# Patient Record
Sex: MALE | Race: BLACK OR AFRICAN AMERICAN | Employment: OTHER | URBAN - METROPOLITAN AREA
[De-identification: names, ages, dates, MRNs, and addresses within clinical notes are randomized per-mention and may not be internally consistent; named-entity substitution may affect disease eponyms.]

---

## 2020-02-25 ENCOUNTER — OFFICE VISIT (OUTPATIENT)
Dept: INTERNAL MEDICINE CLINIC | Age: 65
End: 2020-02-25
Payer: MEDICAID

## 2020-02-25 VITALS
WEIGHT: 154 LBS | BODY MASS INDEX: 20.86 KG/M2 | SYSTOLIC BLOOD PRESSURE: 142 MMHG | HEIGHT: 72 IN | DIASTOLIC BLOOD PRESSURE: 80 MMHG

## 2020-02-25 PROBLEM — G47.33 OSA (OBSTRUCTIVE SLEEP APNEA): Status: ACTIVE | Noted: 2020-02-25

## 2020-02-25 PROCEDURE — G8484 FLU IMMUNIZE NO ADMIN: HCPCS | Performed by: INTERNAL MEDICINE

## 2020-02-25 PROCEDURE — 3017F COLORECTAL CA SCREEN DOC REV: CPT | Performed by: INTERNAL MEDICINE

## 2020-02-25 PROCEDURE — 4004F PT TOBACCO SCREEN RCVD TLK: CPT | Performed by: INTERNAL MEDICINE

## 2020-02-25 PROCEDURE — G8420 CALC BMI NORM PARAMETERS: HCPCS | Performed by: INTERNAL MEDICINE

## 2020-02-25 PROCEDURE — G8427 DOCREV CUR MEDS BY ELIG CLIN: HCPCS | Performed by: INTERNAL MEDICINE

## 2020-02-25 PROCEDURE — 99203 OFFICE O/P NEW LOW 30 MIN: CPT | Performed by: INTERNAL MEDICINE

## 2020-02-25 RX ORDER — GABAPENTIN 800 MG/1
800 TABLET ORAL 2 TIMES DAILY
COMMUNITY
End: 2020-12-07 | Stop reason: SDUPTHER

## 2020-02-25 RX ORDER — CYCLOBENZAPRINE HCL 10 MG
10 TABLET ORAL 2 TIMES DAILY PRN
Status: ON HOLD | COMMUNITY
End: 2020-07-09 | Stop reason: ALTCHOICE

## 2020-02-25 RX ORDER — MELOXICAM 15 MG/1
15 TABLET ORAL DAILY
Status: ON HOLD | COMMUNITY
End: 2020-07-10 | Stop reason: HOSPADM

## 2020-02-25 RX ORDER — LIDOCAINE 50 MG/G
1 PATCH TOPICAL DAILY
COMMUNITY

## 2020-02-25 SDOH — ECONOMIC STABILITY: TRANSPORTATION INSECURITY
IN THE PAST 12 MONTHS, HAS LACK OF TRANSPORTATION KEPT YOU FROM MEETINGS, WORK, OR FROM GETTING THINGS NEEDED FOR DAILY LIVING?: NO

## 2020-02-25 SDOH — ECONOMIC STABILITY: FOOD INSECURITY: WITHIN THE PAST 12 MONTHS, THE FOOD YOU BOUGHT JUST DIDN'T LAST AND YOU DIDN'T HAVE MONEY TO GET MORE.: NEVER TRUE

## 2020-02-25 SDOH — ECONOMIC STABILITY: INCOME INSECURITY: HOW HARD IS IT FOR YOU TO PAY FOR THE VERY BASICS LIKE FOOD, HOUSING, MEDICAL CARE, AND HEATING?: NOT HARD AT ALL

## 2020-02-25 SDOH — ECONOMIC STABILITY: FOOD INSECURITY: WITHIN THE PAST 12 MONTHS, YOU WORRIED THAT YOUR FOOD WOULD RUN OUT BEFORE YOU GOT MONEY TO BUY MORE.: NEVER TRUE

## 2020-02-25 SDOH — ECONOMIC STABILITY: TRANSPORTATION INSECURITY
IN THE PAST 12 MONTHS, HAS THE LACK OF TRANSPORTATION KEPT YOU FROM MEDICAL APPOINTMENTS OR FROM GETTING MEDICATIONS?: NO

## 2020-02-25 SDOH — HEALTH STABILITY: MENTAL HEALTH: HOW OFTEN DO YOU HAVE A DRINK CONTAINING ALCOHOL?: 2-4 TIMES A MONTH

## 2020-02-25 ASSESSMENT — PATIENT HEALTH QUESTIONNAIRE - PHQ9
SUM OF ALL RESPONSES TO PHQ9 QUESTIONS 1 & 2: 0
SUM OF ALL RESPONSES TO PHQ QUESTIONS 1-9: 0
SUM OF ALL RESPONSES TO PHQ QUESTIONS 1-9: 0
2. FEELING DOWN, DEPRESSED OR HOPELESS: 0
1. LITTLE INTEREST OR PLEASURE IN DOING THINGS: 0

## 2020-02-25 NOTE — PROGRESS NOTES
2020     Tammy Gilliland (:  1955) is a 60 y.o. male, here for evaluation of the following medical concerns:    Chief Complaint   Patient presents with    New Patient     pt c/o arthritis and headaches        HPI    History of chronic back pain - has used steroid shot injections but doesn't really like them. Back pain has been ongoing since . Last MRI was 1-3 years ago. Had recommended a fusion but left [place]. Last MRI was in [facility], through the  [facility] [address]. Takes cymbalta for the pain. Has done PT. Has some weakness in both legs, intermittently, will be one side or the other. Feels pain in the anterior thighs when walking. Feet have been cramping up recently. Gabapentin is for nerve damage in the left leg from the back pain. Had one EMG/nerve conduction test in [facility], one here, a year ago. Numbness is anterior thigh, to the knee. Does have some neck pain. Intermittently has weakness in both arms, can't raise above the shoulders. Notes weakness in the hands, does have tendonitis in the left hand. Not sure if he has had an MRI of the cervical spine. Review of Systems   Musculoskeletal: Positive for back pain and neck pain. Neurological: Positive for numbness. Prior to Visit Medications    Medication Sig Taking? Authorizing Provider   cyclobenzaprine (FLEXERIL) 10 MG tablet Take 10 mg by mouth 2 times daily as needed for Muscle spasms Yes Historical Provider, MD   gabapentin (NEURONTIN) 800 MG tablet Take 800 mg by mouth 2 times daily. Yes Historical Provider, MD   lidocaine (LIDODERM) 5 % Place 1 patch onto the skin daily 12 hours on, 12 hours off.  Yes Historical Provider, MD   dextrose 5 % SOLN 49 mL with alprostadil 500 MCG/ML SOLN 500 mcg Infuse 0.01 mcg/kg/min intravenously continuous Inject 10 mcg intravenously as needed for use prior to sexual activity Yes Historical Provider, MD   meloxicam (MOBIC) 15 MG tablet Take 15 mg by mouth daily  Historical

## 2020-02-26 ASSESSMENT — ENCOUNTER SYMPTOMS: BACK PAIN: 1

## 2020-03-04 ENCOUNTER — HOSPITAL ENCOUNTER (OUTPATIENT)
Dept: MRI IMAGING | Age: 65
Discharge: HOME | End: 2020-03-04
Payer: MEDICAID

## 2020-03-04 ENCOUNTER — TELEPHONE (OUTPATIENT)
Dept: INTERNAL MEDICINE CLINIC | Age: 65
End: 2020-03-04

## 2020-03-04 PROCEDURE — 72148 MRI LUMBAR SPINE W/O DYE: CPT

## 2020-03-05 ENCOUNTER — OFFICE VISIT (OUTPATIENT)
Dept: INTERNAL MEDICINE CLINIC | Age: 65
End: 2020-03-05
Payer: MEDICAID

## 2020-03-05 VITALS
DIASTOLIC BLOOD PRESSURE: 82 MMHG | WEIGHT: 154 LBS | HEIGHT: 72 IN | BODY MASS INDEX: 20.86 KG/M2 | SYSTOLIC BLOOD PRESSURE: 122 MMHG

## 2020-03-05 PROCEDURE — 4004F PT TOBACCO SCREEN RCVD TLK: CPT | Performed by: INTERNAL MEDICINE

## 2020-03-05 PROCEDURE — 99213 OFFICE O/P EST LOW 20 MIN: CPT | Performed by: INTERNAL MEDICINE

## 2020-03-05 PROCEDURE — G8420 CALC BMI NORM PARAMETERS: HCPCS | Performed by: INTERNAL MEDICINE

## 2020-03-05 PROCEDURE — 3017F COLORECTAL CA SCREEN DOC REV: CPT | Performed by: INTERNAL MEDICINE

## 2020-03-05 PROCEDURE — G8427 DOCREV CUR MEDS BY ELIG CLIN: HCPCS | Performed by: INTERNAL MEDICINE

## 2020-03-05 PROCEDURE — G8484 FLU IMMUNIZE NO ADMIN: HCPCS | Performed by: INTERNAL MEDICINE

## 2020-03-05 ASSESSMENT — ENCOUNTER SYMPTOMS: BACK PAIN: 1

## 2020-03-05 NOTE — PROGRESS NOTES
3/5/2020     Liv Jacobson (:  1955) is a 60 y.o. male, here for evaluation of the following medical concerns:    Chief Complaint   Patient presents with    Leg Pain     Patient states that he has been experiencing lower left leg pain since Saturday        HPI    Left lower leg pain started Saturday, severe, could walk but hurt. Back pain has temporarily increased as well. He has had episodes like this before where his left leg will hurt and then slowly improves over time. His current pain medications do help a bit. The cyclobenzaprine does not help the pain as much but does make him tired. Gabapentin helps a bit but takes a while to kick in. The numb/tingling feeling in the left leg did get worse with the increase in pain. Usually these episodes last a couple of days and then improve, he does feel like the left leg is improving. Review of Systems   Musculoskeletal: Positive for back pain. Neurological: Positive for weakness. Prior to Visit Medications    Medication Sig Taking? Authorizing Provider   meloxicam (MOBIC) 15 MG tablet Take 15 mg by mouth daily Yes Historical Provider, MD   cyclobenzaprine (FLEXERIL) 10 MG tablet Take 10 mg by mouth 2 times daily as needed for Muscle spasms Yes Historical Provider, MD   gabapentin (NEURONTIN) 800 MG tablet Take 800 mg by mouth 2 times daily. Yes Historical Provider, MD   lidocaine (LIDODERM) 5 % Place 1 patch onto the skin daily 12 hours on, 12 hours off.  Yes Historical Provider, MD   dextrose 5 % SOLN 49 mL with alprostadil 500 MCG/ML SOLN 500 mcg Infuse 0.01 mcg/kg/min intravenously continuous Inject 10 mcg intravenously as needed for use prior to sexual activity Yes Historical Provider, MD        Past Medical History:   Diagnosis Date    BPH (benign prostatic hyperplasia)     Colon polyps        Past Surgical History:   Procedure Laterality Date    ABDOMINAL HERNIA REPAIR      TURP         Social History     Tobacco Use    Smoking status: Current Every Day Smoker     Packs/day: 0.50     Years: 48.00     Pack years: 24.00     Types: Cigarettes    Smokeless tobacco: Never Used   Substance Use Topics    Alcohol use: Yes     Frequency: 2-4 times a month        Family History   Problem Relation Age of Onset    Diabetes Mother     Cancer Father         prostate?  Heart Attack Father     Stroke Brother     Diabetes Brother     Heart Attack Brother     Diabetes Brother     Dementia Sister        Vitals:    03/05/20 0833   BP: 122/82   Weight: 154 lb (69.9 kg)   Height: 6' (1.829 m)     Estimated body mass index is 20.89 kg/m² as calculated from the following:    Height as of this encounter: 6' (1.829 m). Weight as of this encounter: 154 lb (69.9 kg). Physical Exam  Constitutional:       General: He is not in acute distress. Musculoskeletal:         General: No deformity. Right lower leg: No edema. Left lower leg: No edema. Comments: He does have some midline tenderness over the lower thoracic/upper lumbar area corresponding to areas noted to have mild recent compression deformity on MRI. Skin:     General: Skin is warm and dry. Neurological:      Mental Status: He is alert. Gait: Gait abnormal (antalgic). Deep Tendon Reflexes: Reflexes normal.      Comments: Only minimal weakness with left knee extension, otherwise strength intact in the lower extremities. Mild tenderness over the left shin         ASSESSMENT/PLAN:  1. Chronic bilateral low back pain with left-sided sciatica  -At this point symptoms are improving with no change in therapy, I would not add anything right now. He is not interested in steroid spinal injections since the last one only lasted about 30 days. I reviewed the MRI with him, no severe findings but would have him see a spinal surgeon to see if there is anything else that can be offered.   - ROHINI - Mitzi Singh MD, Neurosurgery, [facility]

## 2020-03-05 NOTE — LETTER
[facility] [address]  [address] [address]  Phone: [phone]  Fax: [phone]    Clay Rudolph MD        March 5, 2020     Patient: Ofe Fulton   YOB: 1955   Date of Visit: 3/5/2020       To Whom It May Concern: It is my medical opinion that Vannessa Lozada requires a disability parking placard for the following reasons:  He cannot walk without assistance from another person or the use of an assistance device (cane, crutch, prosthetic device, wheelchair, etc.). Duration of need: 5 years    If you have any questions or concerns, please don't hesitate to call.     Sincerely,        Clay Rudolph MD

## 2020-03-06 DIAGNOSIS — E87.6 HYPOKALEMIA: ICD-10-CM

## 2020-03-06 DIAGNOSIS — G47.33 OSA (OBSTRUCTIVE SLEEP APNEA): ICD-10-CM

## 2020-03-06 DIAGNOSIS — M54.50 CHRONIC BILATERAL LOW BACK PAIN, UNSPECIFIED WHETHER SCIATICA PRESENT: ICD-10-CM

## 2020-03-06 DIAGNOSIS — G89.29 CHRONIC BILATERAL LOW BACK PAIN, UNSPECIFIED WHETHER SCIATICA PRESENT: ICD-10-CM

## 2020-03-06 DIAGNOSIS — Z13.21 ENCOUNTER FOR VITAMIN DEFICIENCY SCREENING: ICD-10-CM

## 2020-03-06 DIAGNOSIS — Z13.220 SCREENING CHOLESTEROL LEVEL: ICD-10-CM

## 2020-03-06 LAB
A/G RATIO: 1.5 (ref 1.1–2.2)
ALBUMIN SERPL-MCNC: 4.3 G/DL (ref 3.4–5)
ALP BLD-CCNC: 83 U/L (ref 40–129)
ALT SERPL-CCNC: 9 U/L (ref 10–40)
ANION GAP SERPL CALCULATED.3IONS-SCNC: 11 MMOL/L (ref 3–16)
AST SERPL-CCNC: 10 U/L (ref 15–37)
BASOPHILS ABSOLUTE: 0 K/UL (ref 0–0.2)
BASOPHILS RELATIVE PERCENT: 0.7 %
BILIRUB SERPL-MCNC: 0.7 MG/DL (ref 0–1)
BUN BLDV-MCNC: 7 MG/DL (ref 7–20)
CALCIUM SERPL-MCNC: 9.6 MG/DL (ref 8.3–10.6)
CHLORIDE BLD-SCNC: 98 MMOL/L (ref 99–110)
CHOLESTEROL, TOTAL: 183 MG/DL (ref 0–199)
CO2: 29 MMOL/L (ref 21–32)
CREAT SERPL-MCNC: 0.8 MG/DL (ref 0.8–1.3)
EOSINOPHILS ABSOLUTE: 0.1 K/UL (ref 0–0.6)
EOSINOPHILS RELATIVE PERCENT: 1.6 %
GFR AFRICAN AMERICAN: >60
GFR NON-AFRICAN AMERICAN: >60
GLOBULIN: 2.8 G/DL
GLUCOSE BLD-MCNC: 118 MG/DL (ref 70–99)
HCT VFR BLD CALC: 45.1 % (ref 40.5–52.5)
HDLC SERPL-MCNC: 45 MG/DL (ref 40–60)
HEMOGLOBIN: 14.7 G/DL (ref 13.5–17.5)
LDL CHOLESTEROL CALCULATED: 124 MG/DL
LYMPHOCYTES ABSOLUTE: 1.5 K/UL (ref 1–5.1)
LYMPHOCYTES RELATIVE PERCENT: 29.6 %
MCH RBC QN AUTO: 31.2 PG (ref 26–34)
MCHC RBC AUTO-ENTMCNC: 32.6 G/DL (ref 31–36)
MCV RBC AUTO: 95.7 FL (ref 80–100)
MONOCYTES ABSOLUTE: 0.4 K/UL (ref 0–1.3)
MONOCYTES RELATIVE PERCENT: 8.4 %
NEUTROPHILS ABSOLUTE: 3.1 K/UL (ref 1.7–7.7)
NEUTROPHILS RELATIVE PERCENT: 59.7 %
PDW BLD-RTO: 13.4 % (ref 12.4–15.4)
PLATELET # BLD: 213 K/UL (ref 135–450)
PMV BLD AUTO: 7.9 FL (ref 5–10.5)
POTASSIUM SERPL-SCNC: 4.7 MMOL/L (ref 3.5–5.1)
RBC # BLD: 4.71 M/UL (ref 4.2–5.9)
SODIUM BLD-SCNC: 138 MMOL/L (ref 136–145)
TOTAL PROTEIN: 7.1 G/DL (ref 6.4–8.2)
TRIGL SERPL-MCNC: 70 MG/DL (ref 0–150)
VITAMIN D 25-HYDROXY: 7.4 NG/ML
VLDLC SERPL CALC-MCNC: 14 MG/DL
WBC # BLD: 5.2 K/UL (ref 4–11)

## 2020-03-09 RX ORDER — ERGOCALCIFEROL 1.25 MG/1
50000 CAPSULE ORAL WEEKLY
Qty: 12 CAPSULE | Refills: 0 | Status: SHIPPED | OUTPATIENT
Start: 2020-03-09

## 2020-03-12 ENCOUNTER — TELEPHONE (OUTPATIENT)
Dept: INTERNAL MEDICINE CLINIC | Age: 65
End: 2020-03-12

## 2020-03-12 NOTE — TELEPHONE ENCOUNTER
Pt called back he needed the information to the     [address] [place] [address], MD  [address], [address] [place] [address]  Ph: [phone]  Fax: [phone]      The information was given to him.

## 2020-03-12 NOTE — TELEPHONE ENCOUNTER
[phone] (home)   1 st attempt to call pt back pt did not answer, and I could not leave a message due to the mailbox being full

## 2020-04-03 ENCOUNTER — TELEPHONE (OUTPATIENT)
Dept: INTERNAL MEDICINE CLINIC | Age: 65
End: 2020-04-03

## 2020-04-07 ENCOUNTER — OFFICE VISIT (OUTPATIENT)
Dept: INTERNAL MEDICINE CLINIC | Age: 65
End: 2020-04-07
Payer: MEDICAID

## 2020-04-07 VITALS
DIASTOLIC BLOOD PRESSURE: 90 MMHG | OXYGEN SATURATION: 98 % | WEIGHT: 155.4 LBS | SYSTOLIC BLOOD PRESSURE: 182 MMHG | HEIGHT: 72 IN | BODY MASS INDEX: 21.05 KG/M2 | HEART RATE: 87 BPM

## 2020-04-07 PROCEDURE — G8420 CALC BMI NORM PARAMETERS: HCPCS | Performed by: INTERNAL MEDICINE

## 2020-04-07 PROCEDURE — G8427 DOCREV CUR MEDS BY ELIG CLIN: HCPCS | Performed by: INTERNAL MEDICINE

## 2020-04-07 PROCEDURE — 4004F PT TOBACCO SCREEN RCVD TLK: CPT | Performed by: INTERNAL MEDICINE

## 2020-04-07 PROCEDURE — 3017F COLORECTAL CA SCREEN DOC REV: CPT | Performed by: INTERNAL MEDICINE

## 2020-04-07 PROCEDURE — 99214 OFFICE O/P EST MOD 30 MIN: CPT | Performed by: INTERNAL MEDICINE

## 2020-04-07 RX ORDER — TIZANIDINE 4 MG/1
4 TABLET ORAL 4 TIMES DAILY PRN
Qty: 40 TABLET | Refills: 0 | Status: SHIPPED | OUTPATIENT
Start: 2020-04-07 | End: 2020-05-11 | Stop reason: SDUPTHER

## 2020-04-07 ASSESSMENT — ENCOUNTER SYMPTOMS: BACK PAIN: 1

## 2020-04-07 NOTE — PROGRESS NOTES
 TURP         Social History     Tobacco Use    Smoking status: Current Every Day Smoker     Packs/day: 0.50     Years: 48.00     Pack years: 24.00     Types: Cigarettes    Smokeless tobacco: Never Used   Substance Use Topics    Alcohol use: Yes     Frequency: 2-4 times a month        Family History   Problem Relation Age of Onset    Diabetes Mother     Cancer Father         prostate?  Heart Attack Father     Stroke Brother     Diabetes Brother     Heart Attack Brother     Diabetes Brother     Dementia Sister        Vitals:    04/07/20 0857   BP: (!) 182/90   Site: Left Upper Arm   Position: Sitting   Cuff Size: Medium Adult   Pulse: 87   SpO2: 98%   Weight: 155 lb 6.4 oz (70.5 kg)   Height: 6' (1.829 m)     Estimated body mass index is 21.08 kg/m² as calculated from the following:    Height as of this encounter: 6' (1.829 m). Weight as of this encounter: 155 lb 6.4 oz (70.5 kg). Physical Exam  Vitals signs reviewed. Constitutional:       General: He is not in acute distress. HENT:      Head: Normocephalic and atraumatic. Mouth/Throat:      Mouth: Mucous membranes are moist.   Pulmonary:      Effort: Pulmonary effort is normal.      Breath sounds: Normal breath sounds. Musculoskeletal:      Comments: Spinal tenderness lower thoracic/upper lumbar levels  No step off  Paraspinal tenderness bilaterally from lower thoracic levels down  No SI tenderness   Skin:     General: Skin is warm and dry. Neurological:      Mental Status: He is alert. Deep Tendon Reflexes:      Reflex Scores:       Patellar reflexes are 1+ on the right side and 1+ on the left side. Achilles reflexes are 1+ on the right side and 1+ on the left side. Comments: Decreased sensation in left anterior thigh  Strength intact in bilateral lower extremities, resisted hip flexion on the left increased pain  Ambulating with cane, gait antalgic         ASSESSMENT/PLAN:  1.  Chronic bilateral low back pain,

## 2020-05-11 RX ORDER — TIZANIDINE 4 MG/1
4 TABLET ORAL 4 TIMES DAILY PRN
Qty: 40 TABLET | Refills: 0 | Status: SHIPPED | OUTPATIENT
Start: 2020-05-11 | End: 2020-07-20 | Stop reason: SDUPTHER

## 2020-06-02 NOTE — PROGRESS NOTES
[facility] / [facility] ([facility]) Kierra Rubin, [address]    Acknowledgment of Informed Consent for Surgical or Medical Procedure and Sedation  I agree to allow doctor(s) MIHIR SELLERS and his/her associates or assistants, including residents and/or other qualified medical practitioner to perform the following medical treatment or procedure and to administer or direct the administration of sedation as necessary:  Procedure(s): L3-4, L4-5 ANTERIOR LUMBAR INTERBODY FUSION WITH PEDICLE SCREW FIXATION     My doctor has explained the following regarding the proposed procedure:   the explanation of the procedure   the benefits of the procedure   the potential problems that might occur during recuperation   the risks and side effects of the procedure which could include but are not limited to severe blood loss, infection, stroke or death   the benefits, risks and side effect of alternative procedures including the consequences of declining this procedure or any alternative procedures   the likelihood of achieving satisfactory results. I acknowledge no guarantee or assurance has been made to me regarding the results. I understand that during the course of this treatment/procedure, unforeseen conditions can occur which require an additional or different procedure. I agree to allow my physician or assistants to perform such extension of the original procedure as they may find necessary. I understand that sedation will often result in temporary impairment of memory and fine motor skills and that sedation can occasionally progress to a state of deep sedation or general anesthesia. I understand the risks of anesthesia for surgery include, but are not limited to, sore throat, hoarseness, injury to face, mouth, or teeth; nausea; headache; injury to blood vessels or nerves; death, brain damage, or paralysis.     I understand that if I have a Limitation of Treatment order in effect during my hospitalization, the order may or may not be in effect during this procedure. I give my doctor permission to give me blood or blood products. I understand that there are risks with receiving blood such as hepatitis, AIDS, fever, or allergic reaction. I acknowledge that the risks, benefits, and alternatives of this treatment have been explained to me and that no express or implied warranty has been given by the hospital, any blood bank, or any person or entity as to the blood or blood components transfused. At the discretion of my doctor, I agree to allow observers, equipment/product representatives and allow photographing, and/or televising of the procedure, provided my name or identity is maintained confidentially. I agree the hospital may dispose of or use for scientific or educational purposes any tissue, fluid, or body parts which may be removed.     ________________________________Date________Time______ am/pm  ([employer])  Patient or Signature of Closest Relative or Legal Guardian    ________________________________Date________Time______am/pm      Page 1 of  1  Witness

## 2020-06-26 ENCOUNTER — OFFICE VISIT (OUTPATIENT)
Dept: INTERNAL MEDICINE CLINIC | Age: 65
End: 2020-06-26
Payer: MEDICAID

## 2020-06-26 VITALS
TEMPERATURE: 97.6 F | SYSTOLIC BLOOD PRESSURE: 122 MMHG | HEIGHT: 72 IN | DIASTOLIC BLOOD PRESSURE: 80 MMHG | WEIGHT: 152 LBS | BODY MASS INDEX: 20.59 KG/M2

## 2020-06-26 PROBLEM — M54.50 CHRONIC BILATERAL LOW BACK PAIN: Status: ACTIVE | Noted: 2020-06-26

## 2020-06-26 PROBLEM — G89.29 CHRONIC BILATERAL LOW BACK PAIN: Status: ACTIVE | Noted: 2020-06-26

## 2020-06-26 PROCEDURE — 4004F PT TOBACCO SCREEN RCVD TLK: CPT | Performed by: INTERNAL MEDICINE

## 2020-06-26 PROCEDURE — 3017F COLORECTAL CA SCREEN DOC REV: CPT | Performed by: INTERNAL MEDICINE

## 2020-06-26 PROCEDURE — 99213 OFFICE O/P EST LOW 20 MIN: CPT | Performed by: INTERNAL MEDICINE

## 2020-06-26 PROCEDURE — G8420 CALC BMI NORM PARAMETERS: HCPCS | Performed by: INTERNAL MEDICINE

## 2020-06-26 PROCEDURE — 93000 ELECTROCARDIOGRAM COMPLETE: CPT | Performed by: INTERNAL MEDICINE

## 2020-06-26 PROCEDURE — G8427 DOCREV CUR MEDS BY ELIG CLIN: HCPCS | Performed by: INTERNAL MEDICINE

## 2020-06-26 ASSESSMENT — PATIENT HEALTH QUESTIONNAIRE - PHQ9
SUM OF ALL RESPONSES TO PHQ QUESTIONS 1-9: 0
2. FEELING DOWN, DEPRESSED OR HOPELESS: 0
SUM OF ALL RESPONSES TO PHQ9 QUESTIONS 1 & 2: 0
SUM OF ALL RESPONSES TO PHQ QUESTIONS 1-9: 0
1. LITTLE INTEREST OR PLEASURE IN DOING THINGS: 0

## 2020-06-26 NOTE — PROGRESS NOTES
of heart or lung disease. Planned anesthesia: General   Known anesthesia problems: None   Bleeding risk: No recent or remote history of abnormal bleeding  Personal or FH of DVT/PE: No      Patient Active Problem List   Diagnosis    ALEXANDRU (obstructive sleep apnea)       Past Medical History:   Diagnosis Date    BPH (benign prostatic hyperplasia)     Colon polyps      Past Surgical History:   Procedure Laterality Date    ABDOMINAL HERNIA REPAIR      TURP       Family History   Problem Relation Age of Onset    Diabetes Mother     Cancer Father         prostate?     Heart Attack Father     Stroke Brother     Diabetes Brother     Heart Attack Brother     Diabetes Brother     Dementia Sister      Social History     Socioeconomic History    Marital status:      Spouse name: Not on file    Number of children: Not on file    Years of education: Not on file    Highest education level: Not on file   Occupational History    Not on file   Social Needs    Financial resource strain: Not hard at all   [employer] insecurity     Worry: Never true     Inability: Never true   [employer] needs     Medical: No     Non-medical: No   Tobacco Use    Smoking status: Current Every Day Smoker     Packs/day: 0.50     Years: 48.00     Pack years: 24.00     Types: Cigarettes    Smokeless tobacco: Never Used   Substance and Sexual Activity    Alcohol use: Yes     Frequency: 2-4 times a month    Drug use: Never    Sexual activity: Yes   Lifestyle    Physical activity     Days per week: Not on file     Minutes per session: Not on file    Stress: Not on file   Relationships    Social connections     Talks on phone: Not on file     Gets together: Not on file     Attends Sikhism service: Not on file     Active member of club or organization: Not on file     Attends meetings of clubs or organizations: Not on file     Relationship status: Not on file    Intimate partner violence     Fear of current or ex partner: Not on file     Emotionally abused: Not on file     Physically abused: Not on file     Forced sexual activity: Not on file   Other Topics Concern    Not on file   Social History Narrative    Not on file       Review of Systems  A comprehensive review of systems was negative except for what was noted in the HPI. Physical Exam   Constitutional: He is oriented to person, place, and time. He appears well-developed and well-nourished. No distress. HENT:   Head: Normocephalic and atraumatic. Mouth/Throat: Uvula is midline, oropharynx is clear and moist and mucous membranes are normal.   Eyes: Conjunctivae and EOM are normal. Pupils are equal, round, and reactive to light. Neck: Trachea normal and normal range of motion. Neck supple. No JVD present. Carotid bruit is not present. No mass and no thyromegaly present. Cardiovascular: Normal rate, regular rhythm, normal heart sounds and intact distal pulses. Exam reveals no gallop and no friction rub. No murmur heard. Pulmonary/Chest: Effort normal and breath sounds normal. No respiratory distress. He has no wheezes. He has no rales. Abdominal: Soft. Normal aorta and bowel sounds are normal. He exhibits no distension and no mass. There is no hepatosplenomegaly. No tenderness. Musculoskeletal: He exhibits no edema and no tenderness. Neurological: He is alert and oriented to person, place, and time. He has normal strength. No cranial nerve deficit or sensory deficit. Coordination and gait normal.   Skin: Skin is warm and dry. No rash noted. No erythema. Psychiatric: He has a normal mood and affect. His behavior is normal.     EKG Interpretation:  normal sinus rhythm, left atrial enlargement, there are no previous tracings available for comparison. Lab Review   No visits with results within 2 Month(s) from this visit.    Latest known visit with results is:   Orders Only on 03/06/2020   Component Date Value    Vit D, 25-Hydroxy 03/06/2020 7.4*    contraindications to planned surgery  - EKG 12 Lead    2.  Chronic bilateral low back pain, unspecified whether sciatica present  - progressive, not responsive to conservative management, will undergo spinal fusion

## 2020-07-01 NOTE — PROGRESS NOTES
[facility] PRE-SURGICAL TESTING INSTRUCTIONS                              PRIOR TO PROCEDURE DATE:  1. Please follow any guidelines/instructions prior to your procedure as advised by your surgeon. 2. Arrange for someone to drive you home and be with you for the first 24 hours after discharge for your safety after your procedure for which you received sedation. Ensure it is someone we can share information with regarding your discharge. 3. You must contact your surgeon for instructions IF:   You are taking any blood thinners, aspirin, anti-inflammatory or vitamin E.   There is a change in your physical condition such as a cold, fever, rash, cuts, sores or any other infection, especially near your surgical site. 4. Do not drink alcohol the day before or day of your procedure. 5. A Pre-op History and Physical for surgery MUST be completed by your Physician or Urgent Care within 30 days of your procedure date. Please bring a copy with you on the day of your procedure and along with any other testing performed. THE DAY OF YOUR PROCEDURE:  1. Follow instructions for ARRIVAL TIME as DIRECTED BY YOUR SURGEON. I    2. Enter the MAIN entrance from [employer] and follow the signs to the free [employer] or [employer] parking (offered free of charge 6am-5pm). 3. Enter the Main Entrance of the hospital (do not enter from the lower level of the parking garage). Upon entrance, check in with the  at the main desk on your left. If no one is available at the desk, proceed into the [facility] Waiting Room and go through the door directly into the [facility]. There is a Check-in desk ACROSS from Room 5 (marked with a sign hanging from the ceiling). The phone number for the surgery center is [phone]. 4. Please call [phone] option #2 option #2 if you have not been preregistered yet. On the day of your procedure bring your insurance card and photo ID.  You will be registered at your bedside once brought back to your room. 5. DO NOT EAT ANYTHING eight hours prior to surgery. May have 8 ounces of water 4 hours prior to surgery. 6. MEDICATIONS    Take the following medications with a SMALL sip of water:    Use your usual dose of inhalers the morning of surgery. BRING your rescue inhaler with you to hospital.    Anesthesia does NOT want you to take insulin the morning of surgery. They will control your blood sugar while you are at the hospital. Please contact your ordering physician for instructions regarding your insulin the night before your procedure. If you have an insulin pump, please keep it set on basal rate. 7. Do not swallow water when brushing teeth. No gum, candy, mints or ice chips. Refrain from smoking or at least decrease the amount. 8. Dress in loose, comfortable clothing appropriate for redressing after your procedure. Do not wear jewelry (including body piercings), make-up (especially NO eye make-up), fingernail polish (NO toenail polish if foot/leg surgery), lotion, powders or metal hairclips. 9. Dentures, glasses, or contacts will need to be removed before your procedure. Bring cases for your glasses, contacts, dentures, or hearing aids to protect them while you are in surgery. 10. If you use a CPAP, please bring it with you on the day of your procedure. 11. We recommend that valuable personal  belongings such as cash, cell phones, e-tablets or jewelry, be left at home during your stay. The hospital will not be responsible for valuables that are not secured in the hospital safe. However, if your insurance requires a co-pay, you may want to bring a method of payment, i.e. Check or credit card, if you wish to pay your co-pay the day of surgery. 12. If you are to stay overnight, you may bring a bag with personal items.  Please have any large items you may need brought in by your family after your arrival to your hospital room.    13. If you have a Living Will or Durable Power of , please bring a copy on the day of your procedure. 15. With your permission, one family member may accompany you while you are being prepared for surgery. Once you are ready, additional family members may join you. HOW WE KEEP YOU SAFE and WORK TO PREVENT SURGICAL SITE INFECTIONS:  1. Health care workers should always check your ID bracelet to verify your name and birth date. You will be asked many times to state your name, date of birth, and allergies. 2. Health care workers should always clean their hands with soap or alcohol gel before providing care to you. It is okay to ask anyone if they cleaned their hands before they touch you. 3. You will be actively involved in verifying the type of procedure you are having and ensuring the correct surgical site. This will be confirmed multiple times prior to your procedure. Do NOT dmitri your surgery site UNLESS instructed to by your surgeon. 4. Do not shave or wax for 72 hours prior to procedure near your operative site. Shaving with a razor can irritate your skin and make it easier to develop an infection. On the day of your procedure, any hair that needs to be removed near the surgical site will be clipped by a healthcare worker using a special clippers designed to avoid skin irritation. 5. When you are in the operating room, your surgical site will be cleansed with a special soap, and in most cases, you will be given an antibiotic before the surgery begins. What to expect AFTER YOUR PROCEDURE:  1. Immediately following your procedure, your will be taken to the PACU for the first phase of your recovery. Your nurse will help you recover from any potential side effects of anesthesia, such as extreme drowsiness, changes in your vital signs or breathing patterns. Nausea, headache, muscle aches, or sore throat may also occur after anesthesia.   Your nurse will help you manage these potential side effects. 2. For comfort and safety, arrange to have someone at home with you for the first 24 hours after discharge. 3. You and your family will be given written instructions about your diet, activity, dressing care, medications, and return visits. 4. Once at home, should issues with nausea, pain, or bleeding occur, or should you notice any signs of infection, you should call your surgeon. 5. Always clean your hands before and after caring for your wound. Do not let your family touch your surgery site without cleaning their hands. 6. Narcotic pain medications can cause significant constipation. You may want to add a stool softener to your postoperative medication schedule or speak to your surgeon on how best to manage this SIDE EFFECT. SPECIAL INSTRUCTIONS     Thank you for allowing us to care for you. We strive to exceed your expectations in the delivery of care and service provided to you and your family. If you need to contact us for any reason, please call us at [phone]    Instructions reviewed with patient during preadmission testing phone interview. Tiki Martínez. 7/1/2020 .4:28 PM      ADDITIONAL EDUCATIONAL INFORMATION REVIEWED PER PHONE WITH YOU AND/OR YOUR FAMILY:  No Bring a urine sample on day of surgery  Yes Pain Goal-Taking Control of Your Pain  Yes FAQs about Surgical Site Infections  No Hibiclens® Bathing Instructions   Yes Antibacterial Soap  No Dave® Wipes Bathing Instructions (Obtained from: https://www.[employer]/. pdf )  No Incentive Spirometer Education  No Other

## 2020-07-03 ENCOUNTER — NURSE ONLY (OUTPATIENT)
Dept: PRIMARY CARE CLINIC | Age: 65
End: 2020-07-03
Payer: MEDICAID

## 2020-07-03 PROCEDURE — 99211 OFF/OP EST MAY X REQ PHY/QHP: CPT | Performed by: NURSE PRACTITIONER

## 2020-07-03 NOTE — PROGRESS NOTES
Abdiel Frausto received a viral test for COVID-19. They were educated on isolation and quarantine as appropriate. For any symptoms, they were directed to seek care from their PCP, given contact information to establish with a doctor, directed to an urgent care or the emergency room.

## 2020-07-06 LAB
ANION GAP SERPL CALCULATED.3IONS-SCNC: 8 MMOL/L (ref 3–16)
APTT: 37 SEC (ref 24.2–36.2)
BUN BLDV-MCNC: 7 MG/DL (ref 7–20)
CALCIUM SERPL-MCNC: 9 MG/DL (ref 8.3–10.6)
CHLORIDE BLD-SCNC: 104 MMOL/L (ref 99–110)
CO2: 26 MMOL/L (ref 21–32)
CREAT SERPL-MCNC: 0.6 MG/DL (ref 0.8–1.3)
GFR AFRICAN AMERICAN: >60
GFR NON-AFRICAN AMERICAN: >60
GLUCOSE BLD-MCNC: 106 MG/DL (ref 70–99)
HCT VFR BLD CALC: 48.7 % (ref 40.5–52.5)
HEMOGLOBIN: 15.8 G/DL (ref 13.5–17.5)
INR BLD: 1.09 (ref 0.86–1.14)
MCH RBC QN AUTO: 31.2 PG (ref 26–34)
MCHC RBC AUTO-ENTMCNC: 32.4 G/DL (ref 31–36)
MCV RBC AUTO: 96.4 FL (ref 80–100)
PDW BLD-RTO: 13.1 % (ref 12.4–15.4)
PLATELET # BLD: 220 K/UL (ref 135–450)
PMV BLD AUTO: 8.5 FL (ref 5–10.5)
POTASSIUM SERPL-SCNC: 3.8 MMOL/L (ref 3.5–5.1)
PROTHROMBIN TIME: 12.6 SEC (ref 10–13.2)
RBC # BLD: 5.05 M/UL (ref 4.2–5.9)
SARS-COV-2: NOT DETECTED
SODIUM BLD-SCNC: 138 MMOL/L (ref 136–145)
SOURCE: NORMAL
WBC # BLD: 5.2 K/UL (ref 4–11)

## 2020-07-08 ENCOUNTER — ANESTHESIA EVENT (OUTPATIENT)
Dept: OPERATING ROOM | Age: 65
DRG: 304 | End: 2020-07-08
Payer: MEDICAID

## 2020-07-09 ENCOUNTER — HOSPITAL ENCOUNTER (INPATIENT)
Age: 65
LOS: 3 days | Discharge: HOME HEALTH | DRG: 304 | End: 2020-07-12
Attending: NEUROLOGICAL SURGERY | Admitting: NEUROLOGICAL SURGERY
Payer: MEDICAID

## 2020-07-09 ENCOUNTER — APPOINTMENT (OUTPATIENT)
Dept: GENERAL RADIOLOGY | Age: 65
DRG: 304 | End: 2020-07-09
Attending: NEUROLOGICAL SURGERY
Payer: MEDICAID

## 2020-07-09 ENCOUNTER — ANESTHESIA (OUTPATIENT)
Dept: OPERATING ROOM | Age: 65
DRG: 304 | End: 2020-07-09
Payer: MEDICAID

## 2020-07-09 VITALS
TEMPERATURE: 96.8 F | OXYGEN SATURATION: 100 % | RESPIRATION RATE: 10 BRPM | SYSTOLIC BLOOD PRESSURE: 110 MMHG | DIASTOLIC BLOOD PRESSURE: 73 MMHG

## 2020-07-09 PROBLEM — M43.16 SPONDYLOLISTHESIS OF LUMBAR REGION: Status: ACTIVE | Noted: 2020-07-09

## 2020-07-09 LAB
ABO/RH: NORMAL
ANTIBODY SCREEN: NORMAL

## 2020-07-09 PROCEDURE — 3600000014 HC SURGERY LEVEL 4 ADDTL 15MIN: Performed by: NEUROLOGICAL SURGERY

## 2020-07-09 PROCEDURE — 94761 N-INVAS EAR/PLS OXIMETRY MLT: CPT

## 2020-07-09 PROCEDURE — 1200000000 HC SEMI PRIVATE

## 2020-07-09 PROCEDURE — 0SG10A0 FUSION OF 2 OR MORE LUMBAR VERTEBRAL JOINTS WITH INTERBODY FUSION DEVICE, ANTERIOR APPROACH, ANTERIOR COLUMN, OPEN APPROACH: ICD-10-PCS | Performed by: NEUROLOGICAL SURGERY

## 2020-07-09 PROCEDURE — C9359 IMPLNT,BON VOID FILLER-PUTTY: HCPCS | Performed by: NEUROLOGICAL SURGERY

## 2020-07-09 PROCEDURE — 6360000002 HC RX W HCPCS: Performed by: NEUROLOGICAL SURGERY

## 2020-07-09 PROCEDURE — 3600000004 HC SURGERY LEVEL 4 BASE: Performed by: NEUROLOGICAL SURGERY

## 2020-07-09 PROCEDURE — 0SG10K1 FUSION OF 2 OR MORE LUMBAR VERTEBRAL JOINTS WITH NONAUTOLOGOUS TISSUE SUBSTITUTE, POSTERIOR APPROACH, POSTERIOR COLUMN, OPEN APPROACH: ICD-10-PCS | Performed by: NEUROLOGICAL SURGERY

## 2020-07-09 PROCEDURE — 6360000002 HC RX W HCPCS: Performed by: ANESTHESIOLOGY

## 2020-07-09 PROCEDURE — 3700000001 HC ADD 15 MINUTES (ANESTHESIA): Performed by: NEUROLOGICAL SURGERY

## 2020-07-09 PROCEDURE — 2580000003 HC RX 258: Performed by: ANESTHESIOLOGY

## 2020-07-09 PROCEDURE — 2580000003 HC RX 258: Performed by: NURSE ANESTHETIST, CERTIFIED REGISTERED

## 2020-07-09 PROCEDURE — 86850 RBC ANTIBODY SCREEN: CPT

## 2020-07-09 PROCEDURE — 6370000000 HC RX 637 (ALT 250 FOR IP): Performed by: PHYSICIAN ASSISTANT

## 2020-07-09 PROCEDURE — 72100 X-RAY EXAM L-S SPINE 2/3 VWS: CPT

## 2020-07-09 PROCEDURE — 7100000000 HC PACU RECOVERY - FIRST 15 MIN: Performed by: NEUROLOGICAL SURGERY

## 2020-07-09 PROCEDURE — 8E0WXBG COMPUTER ASSISTED PROCEDURE OF TRUNK REGION, WITH COMPUTERIZED TOMOGRAPHY: ICD-10-PCS | Performed by: NEUROLOGICAL SURGERY

## 2020-07-09 PROCEDURE — 6360000002 HC RX W HCPCS: Performed by: NURSE ANESTHETIST, CERTIFIED REGISTERED

## 2020-07-09 PROCEDURE — C1713 ANCHOR/SCREW BN/BN,TIS/BN: HCPCS | Performed by: NEUROLOGICAL SURGERY

## 2020-07-09 PROCEDURE — 3209999900 FLUORO FOR SURGICAL PROCEDURES

## 2020-07-09 PROCEDURE — 2780000010 HC IMPLANT OTHER: Performed by: NEUROLOGICAL SURGERY

## 2020-07-09 PROCEDURE — C9290 INJ, BUPIVACAINE LIPOSOME: HCPCS | Performed by: NEUROLOGICAL SURGERY

## 2020-07-09 PROCEDURE — 2700000000 HC OXYGEN THERAPY PER DAY

## 2020-07-09 PROCEDURE — 2720000010 HC SURG SUPPLY STERILE: Performed by: NEUROLOGICAL SURGERY

## 2020-07-09 PROCEDURE — 3700000000 HC ANESTHESIA ATTENDED CARE: Performed by: NEUROLOGICAL SURGERY

## 2020-07-09 PROCEDURE — 6360000002 HC RX W HCPCS: Performed by: PHYSICIAN ASSISTANT

## 2020-07-09 PROCEDURE — 86901 BLOOD TYPING SEROLOGIC RH(D): CPT

## 2020-07-09 PROCEDURE — 2580000003 HC RX 258: Performed by: PHYSICIAN ASSISTANT

## 2020-07-09 PROCEDURE — 0SB20ZZ EXCISION OF LUMBAR VERTEBRAL DISC, OPEN APPROACH: ICD-10-PCS | Performed by: NEUROLOGICAL SURGERY

## 2020-07-09 PROCEDURE — C1776 JOINT DEVICE (IMPLANTABLE): HCPCS | Performed by: NEUROLOGICAL SURGERY

## 2020-07-09 PROCEDURE — 2500000003 HC RX 250 WO HCPCS: Performed by: NURSE ANESTHETIST, CERTIFIED REGISTERED

## 2020-07-09 PROCEDURE — 2709999900 HC NON-CHARGEABLE SUPPLY: Performed by: NEUROLOGICAL SURGERY

## 2020-07-09 PROCEDURE — 2500000003 HC RX 250 WO HCPCS: Performed by: NEUROLOGICAL SURGERY

## 2020-07-09 PROCEDURE — 74019 RADEX ABDOMEN 2 VIEWS: CPT

## 2020-07-09 PROCEDURE — 2580000003 HC RX 258: Performed by: NEUROLOGICAL SURGERY

## 2020-07-09 PROCEDURE — 86900 BLOOD TYPING SEROLOGIC ABO: CPT

## 2020-07-09 PROCEDURE — 7100000001 HC PACU RECOVERY - ADDTL 15 MIN: Performed by: NEUROLOGICAL SURGERY

## 2020-07-09 DEVICE — SCREW SPNL L45MM DIA7MM TI POLYAX EXT TAB FOR MINIMALLY: Type: IMPLANTABLE DEVICE | Site: SPINE LUMBAR | Status: FUNCTIONAL

## 2020-07-09 DEVICE — BOWTI ANTERIOR BUTTRESS STAPLE BONE SCREW 5.70 X 25MM
Type: IMPLANTABLE DEVICE | Site: SPINE LUMBAR | Status: FUNCTIONAL
Brand: BOWTI

## 2020-07-09 DEVICE — BONE GRAFT KIT 7510800 INFUSE LARGE II
Type: IMPLANTABLE DEVICE | Site: SPINE LUMBAR | Status: FUNCTIONAL
Brand: INFUSE® BONE GRAFT

## 2020-07-09 DEVICE — IMPLANTABLE DEVICE: Type: IMPLANTABLE DEVICE | Site: SPINE LUMBAR | Status: FUNCTIONAL

## 2020-07-09 DEVICE — WASHER ORTH DIA13MM FOR L TI SCR: Type: IMPLANTABLE DEVICE | Site: SPINE LUMBAR | Status: FUNCTIONAL

## 2020-07-09 DEVICE — SCREW SPNL L45MM DIA6MM TI POLYAX EXT TAB FOR MINIMALLY: Type: IMPLANTABLE DEVICE | Site: SPINE LUMBAR | Status: FUNCTIONAL

## 2020-07-09 DEVICE — SET SCR SPNL TI SGL INNR FOR VIPER 2 MINIMALLY INVASIVE: Type: IMPLANTABLE DEVICE | Site: SPINE LUMBAR | Status: FUNCTIONAL

## 2020-07-09 DEVICE — GRAFT BONE SUB M 6CC BIOACTIVE GLS PUTTY FIBERGRFT: Type: IMPLANTABLE DEVICE | Site: SPINE LUMBAR | Status: FUNCTIONAL

## 2020-07-09 DEVICE — AGENT HEMOSTATIC SURGIFLOW MATRIX KIT W/THROMBIN: Type: IMPLANTABLE DEVICE | Site: SPINE LUMBAR | Status: FUNCTIONAL

## 2020-07-09 DEVICE — ROD SPNL L75MM TI LORDOSED FOR MINIMALLY INVASIVE SURG SYS: Type: IMPLANTABLE DEVICE | Site: SPINE LUMBAR | Status: FUNCTIONAL

## 2020-07-09 RX ORDER — SODIUM CHLORIDE 9 MG/ML
INJECTION, SOLUTION INTRAVENOUS CONTINUOUS PRN
Status: DISCONTINUED | OUTPATIENT
Start: 2020-07-09 | End: 2020-07-09 | Stop reason: SDUPTHER

## 2020-07-09 RX ORDER — CEFAZOLIN SODIUM 2 G/50ML
2 SOLUTION INTRAVENOUS ONCE
Status: COMPLETED | OUTPATIENT
Start: 2020-07-09 | End: 2020-07-09

## 2020-07-09 RX ORDER — ONDANSETRON 2 MG/ML
4 INJECTION INTRAMUSCULAR; INTRAVENOUS EVERY 6 HOURS PRN
Status: DISCONTINUED | OUTPATIENT
Start: 2020-07-09 | End: 2020-07-12 | Stop reason: HOSPADM

## 2020-07-09 RX ORDER — TIZANIDINE 4 MG/1
4 TABLET ORAL 4 TIMES DAILY PRN
Status: DISCONTINUED | OUTPATIENT
Start: 2020-07-09 | End: 2020-07-12 | Stop reason: HOSPADM

## 2020-07-09 RX ORDER — ROCURONIUM BROMIDE 10 MG/ML
INJECTION, SOLUTION INTRAVENOUS PRN
Status: DISCONTINUED | OUTPATIENT
Start: 2020-07-09 | End: 2020-07-09 | Stop reason: SDUPTHER

## 2020-07-09 RX ORDER — LIDOCAINE HYDROCHLORIDE 10 MG/ML
1 INJECTION, SOLUTION EPIDURAL; INFILTRATION; INTRACAUDAL; PERINEURAL
Status: DISCONTINUED | OUTPATIENT
Start: 2020-07-09 | End: 2020-07-09

## 2020-07-09 RX ORDER — FENTANYL CITRATE 50 UG/ML
25 INJECTION, SOLUTION INTRAMUSCULAR; INTRAVENOUS EVERY 5 MIN PRN
Status: DISCONTINUED | OUTPATIENT
Start: 2020-07-09 | End: 2020-07-09

## 2020-07-09 RX ORDER — PROMETHAZINE HYDROCHLORIDE 25 MG/1
12.5 TABLET ORAL EVERY 6 HOURS PRN
Status: DISCONTINUED | OUTPATIENT
Start: 2020-07-09 | End: 2020-07-12 | Stop reason: HOSPADM

## 2020-07-09 RX ORDER — PHENYLEPHRINE HYDROCHLORIDE 10 MG/ML
INJECTION INTRAVENOUS PRN
Status: DISCONTINUED | OUTPATIENT
Start: 2020-07-09 | End: 2020-07-09 | Stop reason: SDUPTHER

## 2020-07-09 RX ORDER — GLYCOPYRROLATE 1 MG/5 ML
SYRINGE (ML) INTRAVENOUS PRN
Status: DISCONTINUED | OUTPATIENT
Start: 2020-07-09 | End: 2020-07-09 | Stop reason: SDUPTHER

## 2020-07-09 RX ORDER — SODIUM CHLORIDE 0.9 % (FLUSH) 0.9 %
10 SYRINGE (ML) INJECTION EVERY 12 HOURS SCHEDULED
Status: DISCONTINUED | OUTPATIENT
Start: 2020-07-09 | End: 2020-07-12 | Stop reason: HOSPADM

## 2020-07-09 RX ORDER — MEPERIDINE HYDROCHLORIDE 25 MG/ML
12.5 INJECTION INTRAMUSCULAR; INTRAVENOUS; SUBCUTANEOUS EVERY 5 MIN PRN
Status: DISCONTINUED | OUTPATIENT
Start: 2020-07-09 | End: 2020-07-09

## 2020-07-09 RX ORDER — SODIUM CHLORIDE 0.9 % (FLUSH) 0.9 %
10 SYRINGE (ML) INJECTION EVERY 12 HOURS SCHEDULED
Status: DISCONTINUED | OUTPATIENT
Start: 2020-07-09 | End: 2020-07-09

## 2020-07-09 RX ORDER — ONDANSETRON 2 MG/ML
INJECTION INTRAMUSCULAR; INTRAVENOUS PRN
Status: DISCONTINUED | OUTPATIENT
Start: 2020-07-09 | End: 2020-07-09 | Stop reason: SDUPTHER

## 2020-07-09 RX ORDER — DIPHENHYDRAMINE HYDROCHLORIDE 50 MG/ML
12.5 INJECTION INTRAMUSCULAR; INTRAVENOUS
Status: DISCONTINUED | OUTPATIENT
Start: 2020-07-09 | End: 2020-07-09

## 2020-07-09 RX ORDER — OXYCODONE HYDROCHLORIDE AND ACETAMINOPHEN 5; 325 MG/1; MG/1
2 TABLET ORAL PRN
Status: DISCONTINUED | OUTPATIENT
Start: 2020-07-09 | End: 2020-07-09

## 2020-07-09 RX ORDER — SODIUM CHLORIDE 9 MG/ML
INJECTION, SOLUTION INTRAVENOUS CONTINUOUS
Status: DISCONTINUED | OUTPATIENT
Start: 2020-07-09 | End: 2020-07-10

## 2020-07-09 RX ORDER — SODIUM CHLORIDE, SODIUM LACTATE, POTASSIUM CHLORIDE, CALCIUM CHLORIDE 600; 310; 30; 20 MG/100ML; MG/100ML; MG/100ML; MG/100ML
INJECTION, SOLUTION INTRAVENOUS CONTINUOUS
Status: DISCONTINUED | OUTPATIENT
Start: 2020-07-09 | End: 2020-07-09

## 2020-07-09 RX ORDER — SODIUM CHLORIDE 0.9 % (FLUSH) 0.9 %
10 SYRINGE (ML) INJECTION PRN
Status: DISCONTINUED | OUTPATIENT
Start: 2020-07-09 | End: 2020-07-12 | Stop reason: HOSPADM

## 2020-07-09 RX ORDER — GABAPENTIN 400 MG/1
800 CAPSULE ORAL 2 TIMES DAILY
Status: DISCONTINUED | OUTPATIENT
Start: 2020-07-09 | End: 2020-07-12 | Stop reason: HOSPADM

## 2020-07-09 RX ORDER — PROCHLORPERAZINE EDISYLATE 5 MG/ML
5 INJECTION INTRAMUSCULAR; INTRAVENOUS
Status: COMPLETED | OUTPATIENT
Start: 2020-07-09 | End: 2020-07-09

## 2020-07-09 RX ORDER — SODIUM CHLORIDE 0.9 % (FLUSH) 0.9 %
10 SYRINGE (ML) INJECTION PRN
Status: DISCONTINUED | OUTPATIENT
Start: 2020-07-09 | End: 2020-07-09

## 2020-07-09 RX ORDER — DEXAMETHASONE SODIUM PHOSPHATE 4 MG/ML
INJECTION, SOLUTION INTRA-ARTICULAR; INTRALESIONAL; INTRAMUSCULAR; INTRAVENOUS; SOFT TISSUE PRN
Status: DISCONTINUED | OUTPATIENT
Start: 2020-07-09 | End: 2020-07-09 | Stop reason: SDUPTHER

## 2020-07-09 RX ORDER — OXYCODONE HYDROCHLORIDE AND ACETAMINOPHEN 5; 325 MG/1; MG/1
1 TABLET ORAL PRN
Status: DISCONTINUED | OUTPATIENT
Start: 2020-07-09 | End: 2020-07-09

## 2020-07-09 RX ORDER — HYDROMORPHONE HCL 110MG/55ML
PATIENT CONTROLLED ANALGESIA SYRINGE INTRAVENOUS PRN
Status: DISCONTINUED | OUTPATIENT
Start: 2020-07-09 | End: 2020-07-09 | Stop reason: SDUPTHER

## 2020-07-09 RX ORDER — LIDOCAINE HYDROCHLORIDE 20 MG/ML
INJECTION, SOLUTION INTRAVENOUS PRN
Status: DISCONTINUED | OUTPATIENT
Start: 2020-07-09 | End: 2020-07-09 | Stop reason: SDUPTHER

## 2020-07-09 RX ORDER — FENTANYL CITRATE 50 UG/ML
INJECTION, SOLUTION INTRAMUSCULAR; INTRAVENOUS PRN
Status: DISCONTINUED | OUTPATIENT
Start: 2020-07-09 | End: 2020-07-09 | Stop reason: SDUPTHER

## 2020-07-09 RX ORDER — LABETALOL 20 MG/4 ML (5 MG/ML) INTRAVENOUS SYRINGE
5 EVERY 10 MIN PRN
Status: DISCONTINUED | OUTPATIENT
Start: 2020-07-09 | End: 2020-07-09

## 2020-07-09 RX ORDER — FENTANYL CITRATE 50 UG/ML
50 INJECTION, SOLUTION INTRAMUSCULAR; INTRAVENOUS EVERY 5 MIN PRN
Status: DISCONTINUED | OUTPATIENT
Start: 2020-07-09 | End: 2020-07-09

## 2020-07-09 RX ORDER — MIDAZOLAM HYDROCHLORIDE 1 MG/ML
INJECTION INTRAMUSCULAR; INTRAVENOUS PRN
Status: DISCONTINUED | OUTPATIENT
Start: 2020-07-09 | End: 2020-07-09 | Stop reason: SDUPTHER

## 2020-07-09 RX ORDER — ERGOCALCIFEROL 1.25 MG/1
50000 CAPSULE ORAL WEEKLY
Status: DISCONTINUED | OUTPATIENT
Start: 2020-07-14 | End: 2020-07-12 | Stop reason: HOSPADM

## 2020-07-09 RX ORDER — DIAZEPAM 5 MG/1
5 TABLET ORAL EVERY 6 HOURS PRN
Status: DISCONTINUED | OUTPATIENT
Start: 2020-07-09 | End: 2020-07-12 | Stop reason: HOSPADM

## 2020-07-09 RX ORDER — PROPOFOL 10 MG/ML
INJECTION, EMULSION INTRAVENOUS PRN
Status: DISCONTINUED | OUTPATIENT
Start: 2020-07-09 | End: 2020-07-09 | Stop reason: SDUPTHER

## 2020-07-09 RX ORDER — PROPOFOL 10 MG/ML
INJECTION, EMULSION INTRAVENOUS CONTINUOUS PRN
Status: DISCONTINUED | OUTPATIENT
Start: 2020-07-09 | End: 2020-07-09 | Stop reason: SDUPTHER

## 2020-07-09 RX ORDER — OXYCODONE HYDROCHLORIDE 5 MG/1
5 TABLET ORAL EVERY 4 HOURS PRN
Status: DISCONTINUED | OUTPATIENT
Start: 2020-07-09 | End: 2020-07-10

## 2020-07-09 RX ORDER — HYDRALAZINE HYDROCHLORIDE 20 MG/ML
5 INJECTION INTRAMUSCULAR; INTRAVENOUS EVERY 10 MIN PRN
Status: DISCONTINUED | OUTPATIENT
Start: 2020-07-09 | End: 2020-07-09

## 2020-07-09 RX ORDER — ONDANSETRON 2 MG/ML
4 INJECTION INTRAMUSCULAR; INTRAVENOUS
Status: DISCONTINUED | OUTPATIENT
Start: 2020-07-09 | End: 2020-07-09

## 2020-07-09 RX ADMIN — OXYCODONE 5 MG: 5 TABLET ORAL at 14:59

## 2020-07-09 RX ADMIN — SODIUM CHLORIDE, SODIUM LACTATE, POTASSIUM CHLORIDE, AND CALCIUM CHLORIDE: 600; 310; 30; 20 INJECTION, SOLUTION INTRAVENOUS at 10:23

## 2020-07-09 RX ADMIN — GABAPENTIN 800 MG: 400 CAPSULE ORAL at 14:59

## 2020-07-09 RX ADMIN — PROPOFOL 100 MCG/KG/MIN: 10 INJECTION, EMULSION INTRAVENOUS at 08:10

## 2020-07-09 RX ADMIN — PROPOFOL 150 MG: 10 INJECTION, EMULSION INTRAVENOUS at 07:51

## 2020-07-09 RX ADMIN — METHOCARBAMOL 1000 MG: 100 INJECTION, SOLUTION INTRAMUSCULAR; INTRAVENOUS at 12:40

## 2020-07-09 RX ADMIN — FENTANYL CITRATE 50 MCG: 50 INJECTION INTRAMUSCULAR; INTRAVENOUS at 12:00

## 2020-07-09 RX ADMIN — MIDAZOLAM HYDROCHLORIDE 2 MG: 2 INJECTION, SOLUTION INTRAMUSCULAR; INTRAVENOUS at 07:35

## 2020-07-09 RX ADMIN — HYDROMORPHONE HYDROCHLORIDE 0.5 MG: 1 INJECTION, SOLUTION INTRAMUSCULAR; INTRAVENOUS; SUBCUTANEOUS at 12:36

## 2020-07-09 RX ADMIN — Medication 0.2 MG: at 07:35

## 2020-07-09 RX ADMIN — CEFAZOLIN SODIUM 2 G: 2 SOLUTION INTRAVENOUS at 08:02

## 2020-07-09 RX ADMIN — FENTANYL CITRATE 50 MCG: 50 INJECTION INTRAMUSCULAR; INTRAVENOUS at 07:51

## 2020-07-09 RX ADMIN — HYDROMORPHONE HYDROCHLORIDE 0.5 MG: 1 INJECTION, SOLUTION INTRAMUSCULAR; INTRAVENOUS; SUBCUTANEOUS at 16:18

## 2020-07-09 RX ADMIN — LIDOCAINE HYDROCHLORIDE 80 MG: 20 INJECTION, SOLUTION INTRAVENOUS at 07:50

## 2020-07-09 RX ADMIN — SODIUM CHLORIDE: 9 INJECTION, SOLUTION INTRAVENOUS at 14:50

## 2020-07-09 RX ADMIN — PHENYLEPHRINE HYDROCHLORIDE 100 MCG: 10 INJECTION INTRAVENOUS at 08:32

## 2020-07-09 RX ADMIN — SODIUM CHLORIDE: 9 INJECTION, SOLUTION INTRAVENOUS at 07:51

## 2020-07-09 RX ADMIN — PHENYLEPHRINE HYDROCHLORIDE 100 MCG: 10 INJECTION INTRAVENOUS at 09:11

## 2020-07-09 RX ADMIN — FENTANYL CITRATE 50 MCG: 50 INJECTION INTRAMUSCULAR; INTRAVENOUS at 08:06

## 2020-07-09 RX ADMIN — ROCURONIUM BROMIDE 50 MG: 10 INJECTION, SOLUTION INTRAVENOUS at 08:09

## 2020-07-09 RX ADMIN — OXYCODONE 5 MG: 5 TABLET ORAL at 19:48

## 2020-07-09 RX ADMIN — ROCURONIUM BROMIDE 45 MG: 10 INJECTION, SOLUTION INTRAVENOUS at 07:52

## 2020-07-09 RX ADMIN — DEXAMETHASONE SODIUM PHOSPHATE 10 MG: 4 INJECTION, SOLUTION INTRAMUSCULAR; INTRAVENOUS at 08:06

## 2020-07-09 RX ADMIN — CEFAZOLIN 2 G: 10 INJECTION, POWDER, FOR SOLUTION INTRAVENOUS at 16:20

## 2020-07-09 RX ADMIN — FENTANYL CITRATE 25 MCG: 50 INJECTION, SOLUTION INTRAMUSCULAR; INTRAVENOUS at 13:17

## 2020-07-09 RX ADMIN — SODIUM CHLORIDE, SODIUM LACTATE, POTASSIUM CHLORIDE, AND CALCIUM CHLORIDE: 600; 310; 30; 20 INJECTION, SOLUTION INTRAVENOUS at 06:45

## 2020-07-09 RX ADMIN — GABAPENTIN 800 MG: 400 CAPSULE ORAL at 19:48

## 2020-07-09 RX ADMIN — Medication 10 ML: at 19:48

## 2020-07-09 RX ADMIN — SUGAMMADEX 100 MG: 100 INJECTION, SOLUTION INTRAVENOUS at 10:03

## 2020-07-09 RX ADMIN — METHOCARBAMOL 1000 MG: 100 INJECTION, SOLUTION INTRAMUSCULAR; INTRAVENOUS at 19:48

## 2020-07-09 RX ADMIN — HYDROMORPHONE HYDROCHLORIDE 0.5 MG: 1 INJECTION, SOLUTION INTRAMUSCULAR; INTRAVENOUS; SUBCUTANEOUS at 12:05

## 2020-07-09 RX ADMIN — ROCURONIUM BROMIDE 5 MG: 10 INJECTION, SOLUTION INTRAVENOUS at 07:51

## 2020-07-09 RX ADMIN — PHENYLEPHRINE HYDROCHLORIDE 50 MCG: 10 INJECTION INTRAVENOUS at 10:39

## 2020-07-09 RX ADMIN — HYDROMORPHONE HYDROCHLORIDE 1 MG: 2 INJECTION, SOLUTION INTRAMUSCULAR; INTRAVENOUS; SUBCUTANEOUS at 09:59

## 2020-07-09 RX ADMIN — HYDROMORPHONE HYDROCHLORIDE 0.5 MG: 1 INJECTION, SOLUTION INTRAMUSCULAR; INTRAVENOUS; SUBCUTANEOUS at 21:26

## 2020-07-09 RX ADMIN — ONDANSETRON 4 MG: 2 INJECTION INTRAMUSCULAR; INTRAVENOUS at 08:06

## 2020-07-09 RX ADMIN — PROCHLORPERAZINE EDISYLATE 5 MG: 5 INJECTION INTRAMUSCULAR; INTRAVENOUS at 13:07

## 2020-07-09 ASSESSMENT — PAIN DESCRIPTION - PAIN TYPE
TYPE: SURGICAL PAIN

## 2020-07-09 ASSESSMENT — PAIN DESCRIPTION - DESCRIPTORS
DESCRIPTORS: ACHING;SHARP
DESCRIPTORS: SHARP;ACHING
DESCRIPTORS: ACHING;SHARP
DESCRIPTORS: ACHING
DESCRIPTORS: ACHING;SHARP

## 2020-07-09 ASSESSMENT — PULMONARY FUNCTION TESTS
PIF_VALUE: 17
PIF_VALUE: 20
PIF_VALUE: 5
PIF_VALUE: 22
PIF_VALUE: 22
PIF_VALUE: 24
PIF_VALUE: 24
PIF_VALUE: 22
PIF_VALUE: 25
PIF_VALUE: 22
PIF_VALUE: 25
PIF_VALUE: 22
PIF_VALUE: 23
PIF_VALUE: 25
PIF_VALUE: 5
PIF_VALUE: 24
PIF_VALUE: 22
PIF_VALUE: 19
PIF_VALUE: 24
PIF_VALUE: 1
PIF_VALUE: 19
PIF_VALUE: 18
PIF_VALUE: 25
PIF_VALUE: 22
PIF_VALUE: 3
PIF_VALUE: 23
PIF_VALUE: 19
PIF_VALUE: 25
PIF_VALUE: 18
PIF_VALUE: 23
PIF_VALUE: 19
PIF_VALUE: 17
PIF_VALUE: 1
PIF_VALUE: 22
PIF_VALUE: 22
PIF_VALUE: 25
PIF_VALUE: 23
PIF_VALUE: 23
PIF_VALUE: 19
PIF_VALUE: 23
PIF_VALUE: 22
PIF_VALUE: 23
PIF_VALUE: 23
PIF_VALUE: 22
PIF_VALUE: 25
PIF_VALUE: 18
PIF_VALUE: 13
PIF_VALUE: 1
PIF_VALUE: 18
PIF_VALUE: 22
PIF_VALUE: 22
PIF_VALUE: 20
PIF_VALUE: 19
PIF_VALUE: 22
PIF_VALUE: 25
PIF_VALUE: 22
PIF_VALUE: 1
PIF_VALUE: 17
PIF_VALUE: 24
PIF_VALUE: 19
PIF_VALUE: 22
PIF_VALUE: 23
PIF_VALUE: 18
PIF_VALUE: 23
PIF_VALUE: 22
PIF_VALUE: 20
PIF_VALUE: 26
PIF_VALUE: 18
PIF_VALUE: 19
PIF_VALUE: 18
PIF_VALUE: 19
PIF_VALUE: 18
PIF_VALUE: 5
PIF_VALUE: 16
PIF_VALUE: 17
PIF_VALUE: 23
PIF_VALUE: 3
PIF_VALUE: 0
PIF_VALUE: 22
PIF_VALUE: 25
PIF_VALUE: 22
PIF_VALUE: 22
PIF_VALUE: 18
PIF_VALUE: 25
PIF_VALUE: 22
PIF_VALUE: 23
PIF_VALUE: 17
PIF_VALUE: 22
PIF_VALUE: 22
PIF_VALUE: 13
PIF_VALUE: 23
PIF_VALUE: 4
PIF_VALUE: 22
PIF_VALUE: 23
PIF_VALUE: 19
PIF_VALUE: 18
PIF_VALUE: 17
PIF_VALUE: 17
PIF_VALUE: 25
PIF_VALUE: 17
PIF_VALUE: 19
PIF_VALUE: 23
PIF_VALUE: 17
PIF_VALUE: 24
PIF_VALUE: 16
PIF_VALUE: 17
PIF_VALUE: 18
PIF_VALUE: 5
PIF_VALUE: 19
PIF_VALUE: 17
PIF_VALUE: 22
PIF_VALUE: 25
PIF_VALUE: 25
PIF_VALUE: 22
PIF_VALUE: 19
PIF_VALUE: 22
PIF_VALUE: 23
PIF_VALUE: 21
PIF_VALUE: 23
PIF_VALUE: 21
PIF_VALUE: 28
PIF_VALUE: 25
PIF_VALUE: 22
PIF_VALUE: 22
PIF_VALUE: 23
PIF_VALUE: 23
PIF_VALUE: 22
PIF_VALUE: 18
PIF_VALUE: 17
PIF_VALUE: 23
PIF_VALUE: 23
PIF_VALUE: 19
PIF_VALUE: 18
PIF_VALUE: 25
PIF_VALUE: 1
PIF_VALUE: 22
PIF_VALUE: 3
PIF_VALUE: 22
PIF_VALUE: 26
PIF_VALUE: 12
PIF_VALUE: 17
PIF_VALUE: 21
PIF_VALUE: 18
PIF_VALUE: 23
PIF_VALUE: 22
PIF_VALUE: 19
PIF_VALUE: 18
PIF_VALUE: 22
PIF_VALUE: 22
PIF_VALUE: 23
PIF_VALUE: 22
PIF_VALUE: 17
PIF_VALUE: 22
PIF_VALUE: 23
PIF_VALUE: 22
PIF_VALUE: 19
PIF_VALUE: 23
PIF_VALUE: 51
PIF_VALUE: 22
PIF_VALUE: 22
PIF_VALUE: 23
PIF_VALUE: 22
PIF_VALUE: 18
PIF_VALUE: 22
PIF_VALUE: 23
PIF_VALUE: 4
PIF_VALUE: 25
PIF_VALUE: 19
PIF_VALUE: 24
PIF_VALUE: 17
PIF_VALUE: 24
PIF_VALUE: 22
PIF_VALUE: 24
PIF_VALUE: 22
PIF_VALUE: 24
PIF_VALUE: 22
PIF_VALUE: 24
PIF_VALUE: 1
PIF_VALUE: 22
PIF_VALUE: 18
PIF_VALUE: 20
PIF_VALUE: 18
PIF_VALUE: 22
PIF_VALUE: 24
PIF_VALUE: 1
PIF_VALUE: 18
PIF_VALUE: 25
PIF_VALUE: 22
PIF_VALUE: 24
PIF_VALUE: 22
PIF_VALUE: 24
PIF_VALUE: 19
PIF_VALUE: 24
PIF_VALUE: 22
PIF_VALUE: 18
PIF_VALUE: 5
PIF_VALUE: 22
PIF_VALUE: 18
PIF_VALUE: 17
PIF_VALUE: 24
PIF_VALUE: 22
PIF_VALUE: 19
PIF_VALUE: 17
PIF_VALUE: 5
PIF_VALUE: 22
PIF_VALUE: 22
PIF_VALUE: 19
PIF_VALUE: 22
PIF_VALUE: 25
PIF_VALUE: 21
PIF_VALUE: 19
PIF_VALUE: 19
PIF_VALUE: 22
PIF_VALUE: 26
PIF_VALUE: 22
PIF_VALUE: 22
PIF_VALUE: 24
PIF_VALUE: 22
PIF_VALUE: 5
PIF_VALUE: 22
PIF_VALUE: 22

## 2020-07-09 ASSESSMENT — PAIN DESCRIPTION - DIRECTION
RADIATING_TOWARDS: L LEG
RADIATING_TOWARDS: L LEG

## 2020-07-09 ASSESSMENT — PAIN SCALES - GENERAL
PAINLEVEL_OUTOF10: 6
PAINLEVEL_OUTOF10: 10
PAINLEVEL_OUTOF10: 10
PAINLEVEL_OUTOF10: 6
PAINLEVEL_OUTOF10: 0
PAINLEVEL_OUTOF10: 9
PAINLEVEL_OUTOF10: 9

## 2020-07-09 ASSESSMENT — PAIN DESCRIPTION - ONSET
ONSET: ON-GOING

## 2020-07-09 ASSESSMENT — PAIN DESCRIPTION - PROGRESSION
CLINICAL_PROGRESSION: NOT CHANGED

## 2020-07-09 ASSESSMENT — PAIN - FUNCTIONAL ASSESSMENT
PAIN_FUNCTIONAL_ASSESSMENT: 0-10
PAIN_FUNCTIONAL_ASSESSMENT: PREVENTS OR INTERFERES SOME ACTIVE ACTIVITIES AND ADLS
PAIN_FUNCTIONAL_ASSESSMENT: PREVENTS OR INTERFERES SOME ACTIVE ACTIVITIES AND ADLS

## 2020-07-09 ASSESSMENT — LIFESTYLE VARIABLES: SMOKING_STATUS: 1

## 2020-07-09 ASSESSMENT — PAIN DESCRIPTION - LOCATION
LOCATION: BACK

## 2020-07-09 ASSESSMENT — PAIN DESCRIPTION - FREQUENCY
FREQUENCY: CONTINUOUS

## 2020-07-09 ASSESSMENT — PAIN DESCRIPTION - ORIENTATION
ORIENTATION: LOWER

## 2020-07-09 NOTE — ANESTHESIA POSTPROCEDURE EVALUATION
Department of Anesthesiology  Postprocedure Note    Patient: Kevin Swift  MRN: [id number]  YOB: 1955  Date of evaluation: 7/9/2020  Time:  1:25 PM     Procedure Summary     Date:  07/09/20 Room / Location:  [facility]    Anesthesia Start:  0730 Anesthesia Stop:  9204    Procedures:       L3-4, L4-5 ANTERIOR LUMBAR INTERBODY FUSION WITH (N/A )      PEDICLE SCREW FIXATION (N/A )      EXPOSURE FOR ANTERIOR LUMBAR INTERBODY FUSION (N/A ) Diagnosis:       Spondylolisthesis of lumbar region      (Spondylolisthesis of lumbar region, Acquired L1-L5  [M43.16])    Surgeon:  Nestor Forbes. Denisse Teresa MD Responsible Provider:  Shirin Romano MD    Anesthesia Type:  general ASA Status:  2          Anesthesia Type: general    Jett Phase I: Jett Score: 5    Jett Phase II:      Last vitals: Reviewed and per EMR flowsheets.        Anesthesia Post Evaluation    Patient location during evaluation: PACU  Patient participation: complete - patient participated  Level of consciousness: awake  Pain score: 2  Airway patency: patent  Nausea & Vomiting: no nausea and no vomiting  Complications: no  Cardiovascular status: hemodynamically stable  Respiratory status: acceptable  Hydration status: euvolemic

## 2020-07-09 NOTE — PROGRESS NOTES
Patient alert and oriented x4, VSS on 2 L nasal cannula. Patient reports some tingling to LLE, improved since surgery per patient. Otherwise, neuro status WNL. Incision to abdomen covered with clean, dry & intact dressing. Bilateral incisions x2 to back are clean, dry & intact with scant amount of serosanguinous drainage. Patient complains of pain - medicated per MAR with prn PO and IV pain medication and repositioned for comfort. Patient denies N/V and is tolerating PO fluids and meals. Patient dangled at the bedside and was able to void 250 mL into urinal.     All fall precautions in place. SCDs in place. IV fluids infusing per orders. Will continue to monitor.

## 2020-07-09 NOTE — PROGRESS NOTES
Patient admitted to 56. VSS on 2 L nasal cannula with exception of elevated BP. Patient oriented to room and call light. All fall precautions in place.

## 2020-07-09 NOTE — PROGRESS NOTES
PACU Transfer Note    Vitals:    07/09/20 1415   BP: (!) 142/82   Pulse: 95   Resp: 20   Temp: 97.8 °F (36.6 °C)   SpO2:        In: 2062 [I.V.:2062]  Out: 700 [Urine:600]    Pain assessment:   Pain Level: (awakened from sleep, denied pain)    Report given to Receiving unit RN.    7/9/2020 2:23 PM

## 2020-07-09 NOTE — ANESTHESIA PRE PROCEDURE
Known Allergies    Problem List:    Patient Active Problem List   Diagnosis Code    ALEXANDRU (obstructive sleep apnea) G47.33    Chronic bilateral low back pain M54.5, G89.29       Past Medical History:        Diagnosis Date    Arthritis     BPH (benign prostatic hyperplasia)     Colon polyps        Past Surgical History:        Procedure Laterality Date    ABDOMINAL HERNIA REPAIR      TURP         Social History:    Social History     Tobacco Use    Smoking status: Current Every Day Smoker     Packs/day: 0.50     Years: 48.00     Pack years: 24.00     Types: Cigarettes    Smokeless tobacco: Never Used   Substance Use Topics    Alcohol use: Yes     Frequency: 2-4 times a month     Comment: 1 per week                                Ready to quit: No  Counseling given: Yes      Vital Signs (Current):   Vitals:    07/01/20 1626 07/09/20 0612   BP:  134/72   Pulse:  74   Resp:  17   Temp:  98.2 °F (36.8 °C)   TempSrc:  Oral   SpO2:  98%   Weight: 152 lb (68.9 kg) 152 lb (68.9 kg)   Height: 6' (1.829 m) 6' (1.829 m)                                              BP Readings from Last 3 Encounters:   07/09/20 134/72   06/26/20 122/80   04/07/20 (!) 182/90       NPO Status: Time of last liquid consumption: 2230                        Time of last solid consumption: 2145                        Date of last liquid consumption: 07/08/20                        Date of last solid food consumption: 07/08/20    BMI:   Wt Readings from Last 3 Encounters:   07/09/20 152 lb (68.9 kg)   06/26/20 152 lb (68.9 kg)   04/07/20 155 lb 6.4 oz (70.5 kg)     Body mass index is 20.61 kg/m².     CBC:   Lab Results   Component Value Date    WBC 5.2 07/06/2020    RBC 5.05 07/06/2020    HGB 15.8 07/06/2020    HCT 48.7 07/06/2020    MCV 96.4 07/06/2020    RDW 13.1 07/06/2020     07/06/2020       CMP:   Lab Results   Component Value Date     07/06/2020    K 3.8 07/06/2020     07/06/2020    CO2 26 07/06/2020    BUN 7 07/06/2020 CREATININE 0.6 07/06/2020    GFRAA >60 07/06/2020    AGRATIO 1.5 03/06/2020    LABGLOM >60 07/06/2020    GLUCOSE 106 07/06/2020    PROT 7.1 03/06/2020    CALCIUM 9.0 07/06/2020    BILITOT 0.7 03/06/2020    ALKPHOS 83 03/06/2020    AST 10 03/06/2020    ALT 9 03/06/2020       POC Tests: No results for input(s): POCGLU, POCNA, POCK, POCCL, POCBUN, POCHEMO, POCHCT in the last 72 hours. Coags:   Lab Results   Component Value Date    PROTIME 12.6 07/06/2020    INR 1.09 07/06/2020    APTT 37.0 07/06/2020       HCG (If Applicable): No results found for: PREGTESTUR, PREGSERUM, HCG, HCGQUANT     ABGs: No results found for: PHART, PO2ART, YDQ1LCD, KFE5PKA, BEART, M7JFLONW     Type & Screen (If Applicable):  No results found for: LABABO, LABRH    Drug/Infectious Status (If Applicable):  No results found for: HIV, HEPCAB    COVID-19 Screening (If Applicable):   Lab Results   Component Value Date    COVID19 Not Detected 07/03/2020         Anesthesia Evaluation   no history of anesthetic complications:   Airway: Mallampati: II  TM distance: >3 FB   Neck ROM: full  Mouth opening: > = 3 FB Dental:    (+) upper dentures and lower dentures      Pulmonary:   (+) sleep apnea:  current smoker    (-) asthma                           Cardiovascular:  Exercise tolerance: good (>4 METS),       (-) hypertension, past MI,  angina and  BUSH                Neuro/Psych:      (-) seizures           GI/Hepatic/Renal:        (-) GERD       Endo/Other:        (-) diabetes mellitus               Abdominal:           Vascular:                                        Anesthesia Plan      general     ASA 2     (-npo MN  -denies any cardiac history, no chest pain or palpitations  -no fever or chills, no cough  -no problems with anesthesia)  Induction: intravenous. MIPS: Postoperative opioids intended. Anesthetic plan and risks discussed with patient. Plan discussed with CRNA.     Attending anesthesiologist reviewed and agrees with Pre Eval Edwin Green MD   7/9/2020

## 2020-07-09 NOTE — PROGRESS NOTES
Pt arrived from OR, s/p  L3-4, L4-5 ANTERIOR LUMBAR INTERBODY FUSION WITH (N/A )      PEDICLE SCREW FIXATION (N/A )      EXPOSURE FOR ANTERIOR LUMBAR INTERBODY FUSION (N/A ), report received from CRNA, pt sedated on arrival, oral airway in place

## 2020-07-09 NOTE — OP NOTE
[address]                [address]                                OPERATIVE REPORT    PATIENT NAME: Anabel Rosado                 :        1955  MED REC NO:   [id number]                          ROOM:       3323  ACCOUNT NO:   [de-identified]                           ADMIT DATE: 2020  PROVIDER:     Polly Llamas    DATE OF PROCEDURE:  2020    PREPROCEDURE DIAGNOSES:  Degenerative disk disease, L3-L4 and L4-L5 disk  spaces. POSTOPERATIVE DIAGNOSIS:  Degenerative disk disease, L3-L4 and L4-L5  disk spaces. OPERATION PERFORMED:  Anterior retroperitoneal exposure of L3-L4 and  L4-L5 disk spaces. SURGEONS:  Laurie Greer MD; Robi Vera MD    ANESTHESIA:  General.    ESTIMATED BLOOD LOSS:  Minimal.    DRAINS:  None. COMPLICATIONS:  None. REASON FOR THE PROCEDURE:  The patient is a 73-year-old male with  history of chronic back and leg pain. He was evaluated by Dr. Adelaide Shoemaker  and was felt to require anterior posterior fusion of the L3-L4 and L4-L5  disk spaces. I met the patient preoperatively in my office and had an  extensive discussion with him regarding the risks related to the  exposure. Risks discussed included bleeding; infection; possibility of  bowel, bladder or ureteral injuries; possibility of nerve damage;  paresthesias; hernias; lymph leaks; the possibility of arterial or  venous thrombosis, requiring a thrombectomy or bypass; and possibility  of retrograde ejaculation were all discussed. The patient understood  these risks and wished to proceed. PROCEDURE IN DETAIL:  The patient was taken to the operating room,  placed on the operating table in the supine position. General  endotracheal anesthesia was induced. IV antibiotics were administered. Nair catheter was placed. Preoperative localization of the disk space  was carried out with fluoroscopy.   The abdomen was then prepped and  draped in the usual sterile fashion. A left paramedian incision was  made. We dissected through the subcutaneous tissues and identified the  left anterior rectus sheath which was incised. The left rectus  abdominis muscle was mobilized from the midline toward the left side. Retroperitoneum was entered in the left lower quadrant. Peritoneal  contents were swept toward the midline. Bookwalter retractor was placed  in the field. Bipolar cautery was used to free up the soft tissue  lateral to the left common iliac artery and vein. The left iliolumbar  vein was ligated. Segment of vessels were clipped and ligated, and we  had excellent exposure of L3-L4 as well as L4-L5 disk spaces. Disk  markers were placed at both levels, confirmed the level of the disk  spaces as well as midline. Once that was completed, diskectomy and cage  placement were carried out by Dr. Kendal Sales at both levels. I then  carefully inspected the wound for hemostasis. When hemostasis was  ensured, I reapproximated the anterior rectus sheath with 0 PDS silk  sutures. Subcutaneous tissue was reapproximated in two layers with 3-0  Vicryl. Skin was closed with 4-0 Monocryl. Fluoroscopy sweep confirmed  no retained sponges or instruments. I was present for the entire  anterior portion of the procedure and assisted Dr. Kendal Sales with his  portion of the procedure, which included soft tissue and blood vessel  retraction to facilitate implant placement.         Rajesh Lacy    D: 07/09/2020 16:19:10       T: 07/09/2020 17:30:51     [email or web address]  Job#: [id number]     Doc#: [id number]    CC:

## 2020-07-09 NOTE — PLAN OF CARE
Problem: Falls - Risk of:  Goal: Will remain free from falls  Description: Will remain free from falls  7/9/2020 1926 by Junaid Hale RN  Outcome: Ongoing  Patient will remain free from falls during this shift. Bed is in the lowest position and the bed and chair alarm is activated. Anti-slip socks are on. Call light is within reach. Will continue to monitor and reassess. Problem: Pain:  Goal: Patient's pain/discomfort is manageable  Description: Patient's pain/discomfort is manageable  7/9/2020 1509 by Carmen Chacko RN  Outcome: Ongoing   RN assesses pain using 0-10 scale. Patient understands how to rate pain using 0-10 scale. Pain is controled with medication per MAR. RN encourages patient to call out for breakthrough pain. Will continue to monitor and reassess. no

## 2020-07-09 NOTE — H&P
Social connections     Talks on phone: None     Gets together: None     Attends [employer] service: None     Active member of club or organization: None     Attends meetings of clubs or organizations: None     Relationship status: None    Intimate partner violence     Fear of current or ex partner: None     Emotionally abused: None     Physically abused: None     Forced sexual activity: None   Other Topics Concern    None   Social History Narrative    None         Medications Prior to Admission:      Prior to Admission medications    Medication Sig Start Date End Date Taking? Authorizing Provider   tiZANidine (ZANAFLEX) 4 MG tablet Take 1 tablet by mouth 4 times daily as needed (muscle spasms) 5/11/20  Yes Bee Chun MD   vitamin D (ERGOCALCIFEROL) 1.25 MG (54272 UT) CAPS capsule Take 1 capsule by mouth once a week 3/9/20  Yes Bee Chun MD   gabapentin (NEURONTIN) 800 MG tablet Take 800 mg by mouth 2 times daily. Yes Historical Provider, MD   lidocaine (LIDODERM) 5 % Place 1 patch onto the skin daily 12 hours on, 12 hours off. Yes Historical Provider, MD   meloxicam (MOBIC) 15 MG tablet Take 15 mg by mouth daily    Historical Provider, MD   dextrose 5 % SOLN 49 mL with alprostadil 500 MCG/ML SOLN 500 mcg Infuse 0.01 mcg/kg/min intravenously continuous Inject 10 mcg intravenously as needed for use prior to sexual activity    Historical Provider, MD         Allergies:  Patient has no known allergies.     PHYSICAL EXAM:      /72   Pulse 74   Temp 98.2 °F (36.8 °C) (Oral)   Resp 17   Ht 6' (1.829 m)   Wt 152 lb (68.9 kg)   SpO2 98%   BMI 20.61 kg/m²      Airway:  Airway patent with no audible stridor    Heart:  Regular rate and rhythm, No murmur noted    Lungs:  No increased work of breathing, good air exchange, clear to auscultation bilaterally, no crackles or wheezing    Abdomen:  Soft, non-distended, non-tender, normal active bowel sounds, no masses palpated    ASSESSMENT AND PLAN    Patient is a 60 y.o. male with above specified procedure planned. 1.  Patient seen and focused exam done today- no new changes since last physical exam on 6-    2. Access to ancillary services are available per request of the provider.     Abram Steen     7/9/2020

## 2020-07-09 NOTE — PROGRESS NOTES
Daughter Dairl Hutchinson called, aware of room number, pt awakened from sleep that he was going to his room, pt denied pain when he was awake
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
## 2020-07-10 ENCOUNTER — APPOINTMENT (OUTPATIENT)
Dept: CT IMAGING | Age: 65
DRG: 304 | End: 2020-07-10
Attending: NEUROLOGICAL SURGERY
Payer: MEDICAID

## 2020-07-10 PROBLEM — Z98.1 S/P LUMBAR AND LUMBOSACRAL FUSION BY ANTERIOR TECHNIQUE: Status: ACTIVE | Noted: 2020-07-10

## 2020-07-10 LAB
HCT VFR BLD CALC: 46.5 % (ref 40.5–52.5)
HEMOGLOBIN: 15.1 G/DL (ref 13.5–17.5)
MCH RBC QN AUTO: 31.7 PG (ref 26–34)
MCHC RBC AUTO-ENTMCNC: 32.6 G/DL (ref 31–36)
MCV RBC AUTO: 97.3 FL (ref 80–100)
PDW BLD-RTO: 13.5 % (ref 12.4–15.4)
PLATELET # BLD: 188 K/UL (ref 135–450)
PMV BLD AUTO: 8.4 FL (ref 5–10.5)
RBC # BLD: 4.78 M/UL (ref 4.2–5.9)
WBC # BLD: 10.8 K/UL (ref 4–11)

## 2020-07-10 PROCEDURE — 97530 THERAPEUTIC ACTIVITIES: CPT

## 2020-07-10 PROCEDURE — 97535 SELF CARE MNGMENT TRAINING: CPT

## 2020-07-10 PROCEDURE — 1200000000 HC SEMI PRIVATE

## 2020-07-10 PROCEDURE — 77011 CT SCAN FOR LOCALIZATION: CPT

## 2020-07-10 PROCEDURE — 6370000000 HC RX 637 (ALT 250 FOR IP): Performed by: NURSE PRACTITIONER

## 2020-07-10 PROCEDURE — 97116 GAIT TRAINING THERAPY: CPT

## 2020-07-10 PROCEDURE — 97162 PT EVAL MOD COMPLEX 30 MIN: CPT

## 2020-07-10 PROCEDURE — 97166 OT EVAL MOD COMPLEX 45 MIN: CPT

## 2020-07-10 PROCEDURE — 6370000000 HC RX 637 (ALT 250 FOR IP): Performed by: PHYSICIAN ASSISTANT

## 2020-07-10 PROCEDURE — 6360000002 HC RX W HCPCS: Performed by: PHYSICIAN ASSISTANT

## 2020-07-10 PROCEDURE — 6360000002 HC RX W HCPCS: Performed by: NURSE PRACTITIONER

## 2020-07-10 PROCEDURE — 2580000003 HC RX 258: Performed by: PHYSICIAN ASSISTANT

## 2020-07-10 PROCEDURE — 36415 COLL VENOUS BLD VENIPUNCTURE: CPT

## 2020-07-10 PROCEDURE — 85027 COMPLETE CBC AUTOMATED: CPT

## 2020-07-10 RX ORDER — OXYCODONE HYDROCHLORIDE 5 MG/1
10 TABLET ORAL EVERY 4 HOURS PRN
Status: DISCONTINUED | OUTPATIENT
Start: 2020-07-10 | End: 2020-07-12 | Stop reason: HOSPADM

## 2020-07-10 RX ORDER — SENNA AND DOCUSATE SODIUM 50; 8.6 MG/1; MG/1
2 TABLET, FILM COATED ORAL 2 TIMES DAILY
Qty: 30 TABLET | Refills: 0 | Status: SHIPPED | OUTPATIENT
Start: 2020-07-10 | End: 2020-07-25

## 2020-07-10 RX ORDER — METOCLOPRAMIDE HYDROCHLORIDE 5 MG/ML
10 INJECTION INTRAMUSCULAR; INTRAVENOUS EVERY 6 HOURS
Status: COMPLETED | OUTPATIENT
Start: 2020-07-10 | End: 2020-07-11

## 2020-07-10 RX ORDER — OXYCODONE HYDROCHLORIDE 5 MG/1
5 TABLET ORAL EVERY 6 HOURS PRN
Qty: 28 TABLET | Refills: 0 | Status: SHIPPED | OUTPATIENT
Start: 2020-07-10 | End: 2020-07-13 | Stop reason: SINTOL

## 2020-07-10 RX ORDER — LIDOCAINE 4 G/G
1 PATCH TOPICAL DAILY
Status: DISCONTINUED | OUTPATIENT
Start: 2020-07-10 | End: 2020-07-12 | Stop reason: HOSPADM

## 2020-07-10 RX ORDER — SENNA AND DOCUSATE SODIUM 50; 8.6 MG/1; MG/1
2 TABLET, FILM COATED ORAL 2 TIMES DAILY
Status: DISCONTINUED | OUTPATIENT
Start: 2020-07-10 | End: 2020-07-12 | Stop reason: HOSPADM

## 2020-07-10 RX ORDER — DIAZEPAM 5 MG/1
5 TABLET ORAL EVERY 6 HOURS PRN
Qty: 40 TABLET | Refills: 0 | Status: SHIPPED | OUTPATIENT
Start: 2020-07-10 | End: 2020-07-13 | Stop reason: SINTOL

## 2020-07-10 RX ORDER — OXYCODONE HYDROCHLORIDE 5 MG/1
5 TABLET ORAL EVERY 4 HOURS PRN
Status: DISCONTINUED | OUTPATIENT
Start: 2020-07-10 | End: 2020-07-12 | Stop reason: HOSPADM

## 2020-07-10 RX ADMIN — OXYCODONE 5 MG: 5 TABLET ORAL at 05:47

## 2020-07-10 RX ADMIN — Medication 10 ML: at 10:13

## 2020-07-10 RX ADMIN — OXYCODONE 10 MG: 5 TABLET ORAL at 18:36

## 2020-07-10 RX ADMIN — METOCLOPRAMIDE 10 MG: 5 INJECTION, SOLUTION INTRAMUSCULAR; INTRAVENOUS at 15:48

## 2020-07-10 RX ADMIN — OXYCODONE 10 MG: 5 TABLET ORAL at 10:12

## 2020-07-10 RX ADMIN — CEFAZOLIN 2 G: 10 INJECTION, POWDER, FOR SOLUTION INTRAVENOUS at 09:08

## 2020-07-10 RX ADMIN — BISACODYL 5 MG: 5 TABLET, COATED ORAL at 10:13

## 2020-07-10 RX ADMIN — OXYCODONE 5 MG: 5 TABLET ORAL at 00:44

## 2020-07-10 RX ADMIN — METHOCARBAMOL 1000 MG: 100 INJECTION, SOLUTION INTRAMUSCULAR; INTRAVENOUS at 03:39

## 2020-07-10 RX ADMIN — OXYCODONE 10 MG: 5 TABLET ORAL at 14:28

## 2020-07-10 RX ADMIN — ENOXAPARIN SODIUM 40 MG: 40 INJECTION SUBCUTANEOUS at 10:11

## 2020-07-10 RX ADMIN — HYDROMORPHONE HYDROCHLORIDE 0.5 MG: 1 INJECTION, SOLUTION INTRAMUSCULAR; INTRAVENOUS; SUBCUTANEOUS at 21:02

## 2020-07-10 RX ADMIN — HYDROMORPHONE HYDROCHLORIDE 0.5 MG: 1 INJECTION, SOLUTION INTRAMUSCULAR; INTRAVENOUS; SUBCUTANEOUS at 07:43

## 2020-07-10 RX ADMIN — GABAPENTIN 800 MG: 400 CAPSULE ORAL at 10:12

## 2020-07-10 RX ADMIN — METOCLOPRAMIDE 10 MG: 5 INJECTION, SOLUTION INTRAMUSCULAR; INTRAVENOUS at 10:12

## 2020-07-10 RX ADMIN — DOCUSATE SODIUM 50 MG AND SENNOSIDES 8.6 MG 2 TABLET: 8.6; 5 TABLET, FILM COATED ORAL at 10:12

## 2020-07-10 RX ADMIN — Medication 10 ML: at 22:07

## 2020-07-10 RX ADMIN — METOCLOPRAMIDE 10 MG: 5 INJECTION, SOLUTION INTRAMUSCULAR; INTRAVENOUS at 21:01

## 2020-07-10 RX ADMIN — GABAPENTIN 800 MG: 400 CAPSULE ORAL at 21:01

## 2020-07-10 RX ADMIN — DOCUSATE SODIUM 50 MG AND SENNOSIDES 8.6 MG 2 TABLET: 8.6; 5 TABLET, FILM COATED ORAL at 21:01

## 2020-07-10 RX ADMIN — CEFAZOLIN 2 G: 10 INJECTION, POWDER, FOR SOLUTION INTRAVENOUS at 00:44

## 2020-07-10 ASSESSMENT — PAIN DESCRIPTION - PAIN TYPE
TYPE: SURGICAL PAIN

## 2020-07-10 ASSESSMENT — PAIN DESCRIPTION - ORIENTATION
ORIENTATION: LOWER

## 2020-07-10 ASSESSMENT — PAIN DESCRIPTION - PROGRESSION
CLINICAL_PROGRESSION: NOT CHANGED

## 2020-07-10 ASSESSMENT — PAIN SCALES - GENERAL
PAINLEVEL_OUTOF10: 10
PAINLEVEL_OUTOF10: 8
PAINLEVEL_OUTOF10: 4
PAINLEVEL_OUTOF10: 8
PAINLEVEL_OUTOF10: 8
PAINLEVEL_OUTOF10: 4
PAINLEVEL_OUTOF10: 10
PAINLEVEL_OUTOF10: 7
PAINLEVEL_OUTOF10: 2

## 2020-07-10 ASSESSMENT — PAIN DESCRIPTION - LOCATION
LOCATION: BACK

## 2020-07-10 ASSESSMENT — PAIN - FUNCTIONAL ASSESSMENT
PAIN_FUNCTIONAL_ASSESSMENT: ACTIVITIES ARE NOT PREVENTED
PAIN_FUNCTIONAL_ASSESSMENT: ACTIVITIES ARE NOT PREVENTED

## 2020-07-10 ASSESSMENT — PAIN DESCRIPTION - DESCRIPTORS
DESCRIPTORS: ACHING
DESCRIPTORS: ACHING;SHARP
DESCRIPTORS: ACHING
DESCRIPTORS: ACHING;SHARP
DESCRIPTORS: ACHING

## 2020-07-10 ASSESSMENT — PAIN DESCRIPTION - FREQUENCY
FREQUENCY: CONTINUOUS

## 2020-07-10 ASSESSMENT — PAIN DESCRIPTION - ONSET
ONSET: ON-GOING

## 2020-07-10 ASSESSMENT — PAIN DESCRIPTION - DIRECTION
RADIATING_TOWARDS: L LEG
RADIATING_TOWARDS: L LEG

## 2020-07-10 NOTE — PROGRESS NOTES
Patient ambulated with RN to bathroom and back to bed x1 assist with a walker. Patient tolerated ambulation well. Patient voiding without complication. Will continue to monitor and reassess.

## 2020-07-10 NOTE — DISCHARGE INSTR - COC
Continuity of Care Form    Patient Name: Kevin Swift   :  1955  MRN:  [id number]    Admit date:  2020  Discharge date:  ***    Code Status Order: Full Code   Advance Directives:   Advance Care Flowsheet Documentation     Date/Time Healthcare Directive Type of Healthcare Directive Copy in [address] Agent's Name Healthcare Agent's Phone Number    20 1718  No, patient does not have an advance directive for healthcare treatment --  -- -- -- --    20 0617  No, patient does not have an advance directive for healthcare treatment --  -- -- -- --    20 1627  Yes, patient has an advance directive for healthcare treatment --  No, copy requested from family -- -- --          Admitting Physician:  Nestor Forbes. Denisse Teresa MD  PCP: Batool Dye MD    Discharging Nurse: [facility] Unit/Room#: 8470/7604-77  Discharging Unit Phone Number: ***    Emergency Contact:   Extended Emergency Contact Information  Primary Emergency Contact: [address] [place] [address] Phone: [phone]  Mobile Phone: [phone]  Relation: Child    Past Surgical History:  Past Surgical History:   Procedure Laterality Date    ABDOMINAL HERNIA REPAIR      LUMBAR FUSION N/A 2020    L3-4, L4-5 ANTERIOR LUMBAR INTERBODY FUSION WITH performed by Nestor Forbes. Denisse Teresa MD at [address] [facility] 2020    PEDICLE SCREW FIXATION performed by Nestor Forbes. Denisse Teresa MD at [address] [facility]         Immunization History: There is no immunization history on file for this patient.     Active Problems:  Patient Active Problem List   Diagnosis Code    ALEXANDRU (obstructive sleep apnea) G47.33    Chronic bilateral low back pain M54.5, G89.29    Spondylolisthesis of lumbar region M43.16    S/P lumbar and lumbosacral fusion by anterior technique Z98.1       Isolation/Infection:   Isolation          No Isolation        Patient Infection Status     None to display          Nurse Assessment:  Last Vital Signs: /78   Pulse 87   Temp 98.3 °F (36.8 °C) (Oral)   Resp 18   Ht 6' (1.829 m)   Wt 152 lb (68.9 kg)   SpO2 92%   BMI 20.61 kg/m²     Last documented pain score (0-10 scale): Pain Level: 7  Last Weight:   Wt Readings from Last 1 Encounters:   20 152 lb (68.9 kg)     Mental Status:  {IP PT MENTAL STATUS:}    IV Access:  { SALVATORE IV ACCESS:577481123}    Nursing Mobility/ADLs:  Walking   {CHP DME DUHI:270383511}  Transfer  {CHP DME TSTR:211739862}  Bathing  {CHP DME JIXL:668758198}  Dressing  {CHP DME HKVZ:186579992}  Toileting  {CHP DME VLPI:618503130}  Feeding  {P DME ADUQ:195308045}  Med Admin  {P DME FOVJ:413052049}  Med Delivery   { SALVATORE MED Delivery:357655656}    Wound Care Documentation and Therapy:        Elimination:  Continence:   · Bowel: {YES / V}  · Bladder: {YES / YT:90235}  Urinary Catheter: {Urinary Catheter:107807526}   Colostomy/Ileostomy/Ileal Conduit: {YES / S}       Date of Last BM: ***    Intake/Output Summary (Last 24 hours) at 7/10/2020 1209  Last data filed at 7/10/2020 0640  Gross per 24 hour   Intake 650 ml   Output 250 ml   Net 400 ml     I/O last 3 completed shifts: In: 6350 [P.O.:650;  I.V.:]  Out: 950 [Urine:850; Blood:100]    Safety Concerns:     [employer] Safety Concerns:267834634}    Impairments/Disabilities:      [employer] Impairments/Disabilities:464466486}    Nutrition Therapy:  Current Nutrition Therapy:   [employer] Diet List:233775010}    Routes of Feeding: {CHP DME Other Feedings:842062222}  Liquids: {Slp liquid thickness:70279}  Daily Fluid Restriction: {CHP DME Yes amt example:730394175}  Last Modified Barium Swallow with Video (Video Swallowing Test): {Done Not Done [facility]:670665904}    Treatments at the Time of Hospital Discharge:   Respiratory Treatments: ***  Oxygen Therapy:  {Therapy; copd oxygen:29725}  Ventilator:    {[facility]98[facility]}    Rehab Therapies: Physical Therapy, Occupational Therapy and SN  Weight Bearing Status/Restrictions: [address] CC Weight Bearin}  Other Medical Equipment (for information only, NOT a DME order):  {EQUIPMENT:912152546}  Other Treatments: ***    Patient's personal belongings (please select all that are sent with patient):  {CHP DME Belongings:261381998}    RN SIGNATURE:  {Esignature:743928618}    CASE MANAGEMENT/SOCIAL WORK SECTION    Inpatient Status Date: ***    Readmission Risk Assessment Score:  Readmission Risk              Risk of Unplanned Readmission:        7           Discharging to Facility/ Agency   · Name:   · Address:  · Phone:  · Fax:    Dialysis Facility (if applicable)   · Name:  · Address:  · Dialysis Schedule:  · Phone:  · Fax:    / signature: {Esignature:877588187}    PHYSICIAN SECTION    Prognosis: {Prognosis:4431651090}    Condition at Discharge: [address] Patient Condition:949572117}    Rehab Potential (if transferring to Rehab): {Prognosis:6515107699}    Recommended Labs or Other Treatments After Discharge: ***    Physician Certification: I certify the above information and transfer of Maurice Page  is necessary for the continuing treatment of the diagnosis listed and that he requires {Admit to Appropriate Level of Care:26155} for {GREATER/LESS:254403528} 30 days.      Update Admission H&P: {CHP DME Changes in YIJNY:162936397}    PHYSICIAN SIGNATURE:  {Esignature:105502382}

## 2020-07-10 NOTE — PLAN OF CARE
Problem: Falls - Risk of:  Goal: Will remain free from falls  Description: Will remain free from falls  Outcome: Ongoing   Fall precautions in place, bed is in lowest position, wheels locked, alarm on, non skid socks on. Call light and bedside table within reach. Will continue to assess and monitor. Problem: Pain:  Goal: Patient's pain/discomfort is manageable  Description: Patient's pain/discomfort is manageable  Outcome: Ongoing   Patient complains of pain at this time. Patient is exhibiting objective and subjective signs of pain. Pain meds given per [facility], wit relief  . Will continue to assess and monitor.

## 2020-07-10 NOTE — PROGRESS NOTES
Occupational Therapy   Occupational Therapy Initial Assessment/Treatment  Date: 7/10/2020   Patient Name: Rosy Prajapati  MRN: [id number]     : 1955    Date of Service: 7/10/2020    Discharge Recommendations: Rosy Prajapati scored a 18/24 on the AM-PAC ADL Inpatient form. Current research shows that an AM-PAC score of 18 or greater is typically associated with a discharge to the patient's home setting. At this time, this patient demonstrates the endurance and safety to discharge home with 24 hr assistance from family. Please see assessment section for further patient specific details. If patient discharges prior to next session this note will serve as a discharge summary. Please see below for the latest assessment towards goals. OT Equipment Recommendations  Other: cont to assess    Assessment   Performance deficits / Impairments: Decreased functional mobility ; Decreased ADL status; Decreased endurance;Decreased strength;Decreased ROM  Assessment: Pt presents POD1 s/p lumbar surgery. Prior to admission pt was IND w/ ADL and fxl mobility, pt presents below baseline - requiring assistance to safely complete self-care and fxl mobiliyt, and could benefit from cont'd skilled OT to address needs. Cont w/ OT per POC  Treatment Diagnosis: Impaired functional mobility, endurance, strength, and ADL status  Prognosis: Good  OT Education: OT Role;Plan of Care;Precautions  Patient Education: Educated Pt on OT role, POC, and spinal pxn. Pt able to IND state 2/3 spinal pxn. Pt was in agreement with and verbalized understanding. REQUIRES OT FOLLOW UP: Yes  Activity Tolerance  Activity Tolerance: Patient Tolerated treatment well;Patient limited by pain  Activity Tolerance: Pt tolerated session well despite complaint of pain  Safety Devices  Safety Devices in place: Yes  Type of devices: Call light within reach; Left in chair;Chair alarm in place;Nurse notified           Patient Diagnosis(es): The encounter diagnosis was S/P lumbar and lumbosacral fusion by anterior technique. has a past medical history of Arthritis, BPH (benign prostatic hyperplasia), and Colon polyps. has a past surgical history that includes TURP; Abdominal hernia repair; lumbar fusion (N/A, 7/9/2020); and lumbar fusion (N/A, 7/9/2020). Treatment Diagnosis: Impaired functional mobility, endurance, strength, and ADL status      Restrictions  Position Activity Restriction  Other position/activity restrictions: activity as tolerated, ambulate     Subjective   General  Chart Reviewed: Yes, Orders  Additional Pertinent Hx: 61y M w/ chronic back-leg pain, POD#1 L3-4 L4-5 ANTERIOR LUMBAR INTERBODY FUSION WITH PEDICLE SCREWS and exposure  Family / Caregiver Present: No  Referring Practitioner: JOAO Alvarez  Diagnosis: spondylolisthesis of lumbar region  Subjective  Subjective: Pt semi-supine on OT/PT approach, agreed to tx.    Patient Currently in Pain: (intitally denied while semi-supine, in session pt reporing progression of pain to 4-5)  Vital Signs  Temp: 100.8 °F (38.2 °C)  Temp Source: Oral  Pulse: 118  Heart Rate Source: Monitor  Resp: 18  BP: (!) 152/84  MAP (mmHg): 107  Level of Consciousness: Responds to Voice or New Confusion or Agitation  Patient Currently in Pain: (intitally denied while semi-supine, in session pt reporing progression of pain to 4-5)  Oxygen Therapy  SpO2: 95 %  O2 Device: None (Room air)  Social/Functional History  Social/Functional History  Lives With: Alone  Type of Home: Apartment  Home Layout: One level, Laundry in basement, Multi-level(3rd floor apt - 18 steps with landings, friend does laundry, bath on levele)  Home Access: Stairs to enter without rails  Entrance Stairs - Number of Steps: 1 SOSA  Bathroom Shower/Tub: Tub/Shower unit  Bathroom Toilet: Standard  Bathroom Accessibility: Not accessible  Home Equipment: (pt unsure if he has reacher)  Receives Help From: Friend(s)(laundry )  ADL Assistance: Independent  Homemaking Assistance: Independent  Homemaking Responsibilities: Yes  Ambulation Assistance: Needs assistance(use of cane)  Active : Yes  Mode of Transportation: Car  Occupation: Retired  Type of occupation:    Leisure & Hobbies: hang with friends       Objective   Vision: Impaired  Vision Exceptions: Wears glasses for reading(lost ones he is supposed to wear all the time)  Hearing: Within functional limits    Orientation  Overall Orientation Status: Within Functional Limits  Observation/Palpation  Posture: Good  Balance  Sitting Balance: Contact guard assistance  Standing Balance: Contact guard assistance  Standing Balance  Time: total ~ 10 mins  Activity: ~4 mins standing at the sink, ~6 mins ambulating via RW  Functional Mobility  Functional - Mobility Device: Rolling Walker  Activity: To/from bathroom; Other  Assist Level: Contact guard assistance(to SBA)  Functional Mobility Comments: Pt amb via RW for ~6 mins around room and hallway.    Toilet Transfers  Toilet - Technique: Ambulating  Equipment Used: Standard toilet(grab bars)  Toilet Transfer: Minimal assistance(cues)  ADL  Grooming: Contact guard assistance(CGA- standing at sink brushing teeth)  Tone RUE  RUE Tone: Normotonic  Tone LUE  LUE Tone: Normotonic  Coordination  Movements Are Fluid And Coordinated: Yes     Bed mobility  Supine to Sit: Minimal assistance(via log roll)  Scooting: Supervision(to EOB )  Transfers  Stand Step Transfers: Contact guard assistance  Sit to stand: Minimal assistance  Stand to sit: Minimal assistance(to SBA)  Vision - Basic Assessment  Prior Vision: Wears glasses only for reading(lost glasses for everday use.)  Cognition  Overall Cognitive Status: WFL                 LUE AROM (degrees)  LUE AROM : WFL  Left Hand AROM (degrees)  Left Hand AROM: WFL  RUE AROM (degrees)  RUE AROM : WFL  Right Hand AROM (degrees)  Right Hand AROM: WFL  LUE Strength  Gross LUE Strength: WFL(BUE strength not formally assessed d/t spinal pxn, WFL for tasks performed)  L Hand General: 3+/5  RUE Strength  Gross RUE Strength: WFL  R Hand General: 3+/5                   Plan   Plan  Times per week: 5-7  Times per day: Daily  Current Treatment Recommendations: Strengthening, Endurance Training, Functional Mobility Training, Self-Care / ADL      AM-PAC Score        AM-PAC Inpatient Daily Activity Raw Score: 18 (07/10/20 1607)  AM-PAC Inpatient ADL T-Scale Score : 38.66 (07/10/20 1607)  ADL Inpatient CMS 0-100% Score: 46.65 (07/10/20 1607)  ADL Inpatient CMS G-Code Modifier : CK (07/10/20 1607)    Goals                       Not met  Short term goals  Time Frame for Short term goals: Discharge  Short term goal 1: Stance with supervision 8min while engaging in ADL/functional mobility to increase endurance   Short term goal 2: transfer to/from toilet with supervision  Short term goal 3: pt will complete LB dressing using AE PRN w/ SUPV  Patient Goals   Patient goals : to go home       Therapy Time   Individual Concurrent Group Co-treatment   Time In 1030         Time Out 1108         Minutes 38         Timed Code Treatment Minutes: 23 Minutes +15 eval = 38 total time         Khushboo Avelar, S/OT  Chryl Semen, MOT-OTR/L [id number]

## 2020-07-10 NOTE — CARE COORDINATION
Rolling walker delivered to patient's room. Case Management Assessment           Initial Evaluation                Date / Time of Evaluation: 7/10/2020 11:58 AM                 Assessment Completed by: Kirstin Nobles     Spoke with patient at bedside regarding discharge needs. Patient lives at home in an apartment with 18 steps to enter. He said he could have someone stay with him and will have transport to home. He has yet to see PT/OT though. He has agreed to home care with [facility] and referral was made with Kavita Arce. Patient also needs a rolling walker at d/c and referral was made with [facility]. Patient Name: Shree Bertrand     YOB: 1955  Diagnosis: Spondylolisthesis of lumbar region [M43.16]  Spondylolisthesis of lumbar region [M43.16]     Date / Time: 7/9/2020  5:34 AM    Patient Admission Status: Inpatient    If patient is discharged prior to next notation, then this note serves as note for discharge by case management.      Current PCP: Kana Leonard MD  Clinic Patient: No    Chart Reviewed: Yes  Patient/ Family Interviewed: Yes    Initial assessment completed at bedside with: patient    Hospitalization in the last 30 days: No    Emergency Contacts:  Extended Emergency Contact Information  Primary Emergency Contact: [address] [place] [address] Phone: [phone]  Mobile Phone: [phone]  Relation: Child    Advance Directives:   Code Status: Full Code    Healthcare Power of : No  Agent: NA  Contact Number: NA    Financial  Payor: Tyson Tate / Plan: Hemaurice 58 / Product Type: *No Product type* /     Pre-cert required for SNF: Yes    Pharmacy    [address] [place] [address] 72.  [address] [facility] [address]  Phone: [phone] Fax: [address], [address] [place] [address] [place] [address]  Phone: [phone] Fax: [phone]      Potential assistance Purchasing Medications: Potential Assistance Purchasing Medications: No  Does Patient want to participate in local refill/ meds to beds program?: No    Meds To Beds General Rules:  1. Can ONLY be done Monday- Friday between 8:30am-5pm  2. Prescription(s) must be in pharmacy by 3pm to be filled same day  3. Copy of patient's insurance/ prescription drug card and patient face sheet must be sent along with the prescription(s)  4. Cost of Rx cannot be added to hospital bill. If financial assistance is needed, please contact unit  or ;  or  CANNOT provide pharmacy voucher for patients co-pays  5.  Patients can then  the prescription on their way out of the hospital at discharge, or pharmacy can deliver to the bedside if staff is available. (payment due at time of pick-up or delivery - cash, check, or card accepted)     Able to afford home medications/ co-pay costs: Yes    ADLS  Support Systems: Children    PT AM-PAC:   /24  OT AM-PAC:   /24    [place]: apartment  Steps: 18    Plans to RETURN to current housing: Yes  Barriers to RETURNING to current housing: none noted    Jose Mena  Currently ACTIVE with [address]: Yes  [address]  Phone: [phone]  Fax: [phone]    Durable Medical Equipment  DME Provider: [facility]  Equipment: walker    DISCHARGE PLAN:  Disposition: Home with [address]: [address] [facility] [address] [facility] [address] for discharge: family     Factors facilitating achievement of predicted outcomes: Family support, Cooperative and Pleasant    Barriers to discharge: Pain, PT/OT need to eval    Additional Case Management Notes: NA    The Plan for Transition of Care is related to the following treatment goals of Spondylolisthesis of lumbar region [M43.16]  Spondylolisthesis of lumbar region [M43.16]    The Patient and/or patient representative Michel Muller and his family were provided with a choice of provider and agrees with the discharge plan Yes    Freedom of choice list was provided with basic dialogue that supports the patient's individualized plan of care/goals and shares the quality data associated with the providers.  Yes    Care Transition patient: No    Phma Good, RN  [facility]  Case Management Department  Ph: [phone]   Fax: [phone]

## 2020-07-10 NOTE — PROGRESS NOTES
Pt is A&O. VSS. Tolerating diet well. Up x1, GB, and walker. Voiding without difficulty, BM PTA. Complaints of pain this shift, controlled with MAR meds. No needs at this time. Will continue to monitor.

## 2020-07-10 NOTE — PROGRESS NOTES
Physical Therapy    Facility/Department: [facility] 5T ORTHO/NEURO  Initial Assessment/Treatment  NAME: Chris Villalpando  : 1955  MRN: [id number]    Date of Service: 7/10/2020    Discharge Recommendations:    Chris Villalpando scored a 18/24 on the AM-PAC short mobility form. Current research shows that an AM-PAC score of 18 or greater is typically associated with a discharge to the patient's home setting. Based on the patient's AM-PAC score and their current functional mobility deficits, it is recommended that the patient have 2-3 sessions per week of Physical Therapy at d/c to increase the patient's independence. At this time, this patient demonstrates the endurance and safety to discharge home with 24hr A (home vs OP services) and a follow up treatment frequency of 2-3x/wk. Please see assessment section for further patient specific details. PT Equipment Recommendations  Equipment Needed: Yes  Mobility Devices: Jana Jensen: Rolling    Assessment   Body structures, Functions, Activity limitations: Decreased functional mobility ; Decreased endurance; Increased pain  Assessment: Pt currently requiring min A for bed mobility via log roll, min-CGA for transfers and CGA-SBA for amb with RW. Mobility limited by pain. Pt has several stairs with rail at home. Pt states his son is in town for as long as needed to assist. Pt would benefit from further skilled PT to maximize safety and independence with functional mobility. Will continue to follow. Treatment Diagnosis: Decreased functional mobility   Prognosis: Good  Decision Making: Medium Complexity  Patient Education: role of PT, use of call light, d/c planning, LLE NWB; pt verb understanding  Barriers to Learning: none  REQUIRES PT FOLLOW UP: Yes  Activity Tolerance  Activity Tolerance: Patient Tolerated treatment well;Patient limited by pain       Patient Diagnosis(es): The encounter diagnosis was S/P lumbar and lumbosacral fusion by anterior technique. has a past medical history of Arthritis, BPH (benign prostatic hyperplasia), and Colon polyps. has a past surgical history that includes TURP; Abdominal hernia repair; lumbar fusion (N/A, 7/9/2020); and lumbar fusion (N/A, 7/9/2020). Restrictions  Position Activity Restriction  Other position/activity restrictions: activity as tolerated, ambulate   Vision/Hearing  Vision: Impaired  Vision Exceptions: Wears glasses for reading(lost ones he is supposed to wear all the time)  Hearing: Within functional limits     Subjective  General  Chart Reviewed: Yes  Additional Pertinent Hx: 59 y/o pt admitted with DDD of L3-L4 and L4-L5 disk s/p Anterior retroperitoneal exposure of L3-L4 and L4-L5 disk spaces. Family / Caregiver Present: No  Referring Practitioner: JOAO Fletcher  Diagnosis: DDD of L3-L4 and L4-L5 disk s/p Anterior retroperitoneal exposure of L3-L4 and L4-L5 disk spaces  Follows Commands: Within Functional Limits  Subjective  Subjective: Pt found supine in bed upon arrival, reporting 4/10 pain and agreeable to therapy.            Orientation  Orientation  Overall Orientation Status: Within Functional Limits  Social/Functional History  Social/Functional History  Lives With: Alone  Type of Home: Apartment  Home Layout: One level, Laundry in basement, Multi-level(3rd floor apt - 18 steps with landings, friend does laundry, bath on levele)  Home Access: Stairs to enter without rails  Entrance Stairs - Number of Steps: 1 SOSA  Bathroom Shower/Tub: Tub/Shower unit  Bathroom Toilet: Standard  Bathroom Accessibility: Not accessible  Home Equipment: [place], [address] Help From: Friend(s)(laundry )  ADL Assistance: Independent  Homemaking Assistance: Independent  Homemaking Responsibilities: Yes  Ambulation Assistance: Needs assistance(use of cane)  Active : Yes  Mode of Transportation: Car  Occupation: Retired  Type of occupation:    Leisure & Hobbies: hang with friends    Objective  AROM RLE d/c  Short term goal 1: sup<>sit supervision via log roll  Short term goal 2: sit<>stand supervision with RW  Short term goal 3: amb 150' with RW supervision   Short term goal 4: ascend/descend flight of stairs supervision with HR  Patient Goals   Patient goals : return home when able       Therapy Time   Individual Concurrent Group Co-treatment   Time In 1030         Time Out 1108         Minutes 38           Timed Code Treatment Minutes:  23    Total Treatment Minutes:  38    If the patient is discharged before the next treatment session, this note will serve as the discharge summary.      Tamara Davenport, PT, DPT [id number]

## 2020-07-10 NOTE — PROGRESS NOTES
NEUROSURGERY POST-OP PROGRESS NOTE    Patient Name: Radha Hayes YOB: 1955   Sex: Male Age: 60 yrs     Medical Record Number: [id number] Acct Number: [de-identified]   Room Number: 3910/9452-16 Hospital Day: Hospital Day: 2     Interval History:  Post-operative Day# 1 s/p Procedure(s) (LRB):  L3-4, L4-5 ANTERIOR LUMBAR INTERBODY FUSION WITH (N/A)  PEDICLE SCREW FIXATION (N/A)  EXPOSURE FOR ANTERIOR LUMBAR INTERBODY FUSION (N/A)    Subjective: Patient states leg pain on L gone but c/o of incisional pain    Objective:    VITAL SIGNS   /78   Pulse 87   Temp 98.3 °F (36.8 °C) (Oral)   Resp 18   Ht 6' (1.829 m)   Wt 152 lb (68.9 kg)   SpO2 92%   BMI 20.61 kg/m²    Height Height: 6' (182.9 cm)   Weight Weight: 152 lb (68.9 kg)        Allergies No Known Allergies   NPO Status DIET GENERAL;   Isolation No active isolations     LABS   Basic Metabolic Profile No results for input(s): NA, CL, CO2, BUN, CREATININE, GLUCOSE, ALB, PHOS, MG in the last 72 hours. Invalid input(s): POTASSIUM, CA   Complete Blood Count Recent Labs     07/10/20  0508   WBC 10.8   RBC 4.78      Coagulation Studies No results for input(s): PTT, INR in the last 72 hours.     Invalid input(s): PLATELETS, PROA, PT, PTTA     MEDICATIONS   Inpatient Medications     gabapentin, 800 mg, Oral, BID    [START ON 7/14/2020] vitamin D, 50,000 Units, Oral, Weekly    sodium chloride flush, 10 mL, Intravenous, 2 times per day    ceFAZolin (ANCEF) IVPB, 2 g, Intravenous, Q8H    enoxaparin, 40 mg, Subcutaneous, Daily   Infusions    sodium chloride 125 mL/hr at 07/09/20 1450      Antibiotics   Recent Abx Admin                   ceFAZolin (ANCEF) 2 g in dextrose 5 % 50 mL IVPB (g) 2 g New Bag 07/10/20 0044     2 g New Bag 07/09/20 1620                 Neurologic Exam:  Mental status: awake and alert and oriented x4    Musculoskeletal:   Gait: Not tested   Tone: normal  Sensory: intact to all extremities  Motor strength:    Right Left    Right  Left    Deltoid  5 5  Hip Flex  5 5   Biceps  5 5  Knee Extensors  5 5   Triceps  5 5  Knee Flexors  5 5   Wrist Ext  5 5  Ankle Dorsiflex. 5 5   Wrist Flex  5 5  Ankle Plantarflex. 5 5   Handgrip  5 5  Ext Gurmeet Longus  5 5   Thumb Ext  5 5         Incision: intact, clean and dry      Respiratory:  Unlabored respiratory pattern    Abdomen:   Soft, ND   Has not passed gas    Cardiovascular:  Warm, well perfused    Assessment   Patient is a 61 yo M s/p Procedure(s) (LRB):  L3-4, L4-5 ANTERIOR LUMBAR INTERBODY FUSION WITH (N/A)  PEDICLE SCREW FIXATION (N/A)  EXPOSURE FOR ANTERIOR LUMBAR INTERBODY FUSION (N/A) per Dr. Mon Anon:  1. Neurologic exam frequency:q4  2. Mobility:PT/OT eval  3. DVT Prophylaxis: SCDs and lovenox  4. Bowel Regimen: dulcolax and senna  5. Pain control:carlos and scheduled tylenol, valium  6. Incisional Care: open to air and shower  7. Dispo Planning:inpt    Patient was seen with Dr. Mikhail Barrett who agrees with above assessment and plan. Electronically signed by:  Cielo Murcia, 7/10/2020 8:54 AM   Neurosurgery Nurse Practitioner  [phone]

## 2020-07-11 PROCEDURE — 6360000002 HC RX W HCPCS: Performed by: PHYSICIAN ASSISTANT

## 2020-07-11 PROCEDURE — 97530 THERAPEUTIC ACTIVITIES: CPT

## 2020-07-11 PROCEDURE — 2580000003 HC RX 258: Performed by: PHYSICIAN ASSISTANT

## 2020-07-11 PROCEDURE — 97535 SELF CARE MNGMENT TRAINING: CPT

## 2020-07-11 PROCEDURE — 6370000000 HC RX 637 (ALT 250 FOR IP): Performed by: PHYSICIAN ASSISTANT

## 2020-07-11 PROCEDURE — 6360000002 HC RX W HCPCS: Performed by: NURSE PRACTITIONER

## 2020-07-11 PROCEDURE — 1200000000 HC SEMI PRIVATE

## 2020-07-11 PROCEDURE — 6370000000 HC RX 637 (ALT 250 FOR IP): Performed by: NURSE PRACTITIONER

## 2020-07-11 RX ADMIN — OXYCODONE 10 MG: 5 TABLET ORAL at 05:22

## 2020-07-11 RX ADMIN — OXYCODONE 10 MG: 5 TABLET ORAL at 23:25

## 2020-07-11 RX ADMIN — OXYCODONE 10 MG: 5 TABLET ORAL at 09:59

## 2020-07-11 RX ADMIN — OXYCODONE 10 MG: 5 TABLET ORAL at 19:20

## 2020-07-11 RX ADMIN — METOCLOPRAMIDE 10 MG: 5 INJECTION, SOLUTION INTRAMUSCULAR; INTRAVENOUS at 05:22

## 2020-07-11 RX ADMIN — GABAPENTIN 800 MG: 400 CAPSULE ORAL at 19:20

## 2020-07-11 RX ADMIN — DOCUSATE SODIUM 50 MG AND SENNOSIDES 8.6 MG 2 TABLET: 8.6; 5 TABLET, FILM COATED ORAL at 08:25

## 2020-07-11 RX ADMIN — DIAZEPAM 5 MG: 5 TABLET ORAL at 08:25

## 2020-07-11 RX ADMIN — OXYCODONE 10 MG: 5 TABLET ORAL at 14:15

## 2020-07-11 RX ADMIN — Medication 10 ML: at 08:26

## 2020-07-11 RX ADMIN — BISACODYL 5 MG: 5 TABLET, COATED ORAL at 08:25

## 2020-07-11 RX ADMIN — ENOXAPARIN SODIUM 40 MG: 40 INJECTION SUBCUTANEOUS at 08:26

## 2020-07-11 RX ADMIN — OXYCODONE 10 MG: 5 TABLET ORAL at 00:28

## 2020-07-11 RX ADMIN — DIAZEPAM 5 MG: 5 TABLET ORAL at 23:03

## 2020-07-11 RX ADMIN — DIAZEPAM 5 MG: 5 TABLET ORAL at 16:10

## 2020-07-11 RX ADMIN — DOCUSATE SODIUM 50 MG AND SENNOSIDES 8.6 MG 2 TABLET: 8.6; 5 TABLET, FILM COATED ORAL at 19:20

## 2020-07-11 RX ADMIN — GABAPENTIN 800 MG: 400 CAPSULE ORAL at 08:25

## 2020-07-11 RX ADMIN — HYDROMORPHONE HYDROCHLORIDE 0.5 MG: 1 INJECTION, SOLUTION INTRAMUSCULAR; INTRAVENOUS; SUBCUTANEOUS at 11:47

## 2020-07-11 RX ADMIN — Medication 10 ML: at 19:20

## 2020-07-11 ASSESSMENT — PAIN - FUNCTIONAL ASSESSMENT
PAIN_FUNCTIONAL_ASSESSMENT: ACTIVITIES ARE NOT PREVENTED

## 2020-07-11 ASSESSMENT — PAIN DESCRIPTION - ONSET
ONSET: ON-GOING

## 2020-07-11 ASSESSMENT — PAIN SCALES - GENERAL
PAINLEVEL_OUTOF10: 0
PAINLEVEL_OUTOF10: 3
PAINLEVEL_OUTOF10: 7
PAINLEVEL_OUTOF10: 8
PAINLEVEL_OUTOF10: 8
PAINLEVEL_OUTOF10: 10
PAINLEVEL_OUTOF10: 0
PAINLEVEL_OUTOF10: 7
PAINLEVEL_OUTOF10: 8
PAINLEVEL_OUTOF10: 9

## 2020-07-11 ASSESSMENT — PAIN DESCRIPTION - LOCATION
LOCATION: BACK

## 2020-07-11 ASSESSMENT — PAIN DESCRIPTION - PAIN TYPE
TYPE: SURGICAL PAIN

## 2020-07-11 ASSESSMENT — PAIN DESCRIPTION - DESCRIPTORS
DESCRIPTORS: ACHING

## 2020-07-11 ASSESSMENT — PAIN DESCRIPTION - PROGRESSION
CLINICAL_PROGRESSION: NOT CHANGED

## 2020-07-11 ASSESSMENT — PAIN DESCRIPTION - ORIENTATION
ORIENTATION: LOWER

## 2020-07-11 ASSESSMENT — PAIN DESCRIPTION - FREQUENCY
FREQUENCY: CONTINUOUS

## 2020-07-11 ASSESSMENT — PAIN DESCRIPTION - DIRECTION
RADIATING_TOWARDS: L LEG
RADIATING_TOWARDS: L LEG

## 2020-07-11 NOTE — PROGRESS NOTES
NEUROSURGERY POST-OP PROGRESS NOTE    Patient Name: Kevin Swift YOB: 1955   Sex: Male Age: 60 yrs     Medical Record Number: [id number] Acct Number: [de-identified]   Room Number: 9756/8957-02 Hospital Day: Hospital Day: 3     Interval History:  Post-operative Day# 2 s/p Procedure(s) (LRB):  L3-4, L4-5 ANTERIOR LUMBAR INTERBODY FUSION WITH (N/A)  PEDICLE SCREW FIXATION (N/A)  EXPOSURE FOR ANTERIOR LUMBAR INTERBODY FUSION (N/A)    Subjective: No acute events overnight. Reports anterior thigh pain on the left. States legs intermittently give out but that this has happened in the past.    Objective:    VITAL SIGNS   BP (!) 145/90   Pulse 104   Temp 99.7 °F (37.6 °C) (Oral)   Resp 16   Ht 6' (1.829 m)   Wt 152 lb (68.9 kg)   SpO2 93%   BMI 20.61 kg/m²    Height Height: 6' (182.9 cm)   Weight Weight: 152 lb (68.9 kg)        Allergies No Known Allergies   NPO Status DIET GENERAL;   Isolation No active isolations     LABS   Basic Metabolic Profile No results for input(s): NA, CL, CO2, BUN, CREATININE, GLUCOSE, ALB, PHOS, MG in the last 72 hours. Invalid input(s): POTASSIUM, CA   Complete Blood Count Recent Labs     07/10/20  0508   WBC 10.8   RBC 4.78      Coagulation Studies No results for input(s): PTT, INR in the last 72 hours. Invalid input(s): PLATELETS, PROA, PT, PTTA     MEDICATIONS   Inpatient Medications     sennosides-docusate sodium, 2 tablet, Oral, BID    lidocaine, 1 patch, Transdermal, Daily    gabapentin, 800 mg, Oral, BID    [START ON 7/14/2020] vitamin D, 50,000 Units, Oral, Weekly    sodium chloride flush, 10 mL, Intravenous, 2 times per day    enoxaparin, 40 mg, Subcutaneous, Daily   Infusions      Antibiotics   Recent Abx Admin      No antibiotic orders with administrations found.                  Neurologic Exam:  Mental status: awake and alert and oriented x4    Musculoskeletal:   Gait: Not tested   Tone: normal  Sensory: intact to all extremities  Motor

## 2020-07-11 NOTE — PLAN OF CARE
Problem: Falls - Risk of:  Goal: Will remain free from falls  Description: Will remain free from falls  Outcome: Ongoing  Patient remains free from falls during this shift. Patient is up x1 person assist with walker. Bed is in the lowest position and the bed and chair alarm is activated. Anti-slip socks are on. Call light is within reach. Will continue to monitor and reassess. Problem: Pain:  Goal: Patient's pain/discomfort is manageable  Description: Patient's pain/discomfort is manageable  Outcome: Ongoing  RN assesses pain using 0-10 scale. Patient understands how to rate pain using 0-10 scale. Pain is controled with medication per MAR. RN encourages patient to call out for breakthrough pain. Will continue to monitor and reassess.

## 2020-07-11 NOTE — PROGRESS NOTES
Occupational Therapy  Facility/Department: [facility] 5T ORTHO/NEURO  Daily Treatment Note  NAME: Hernan Carrillo  : 1955  MRN: [id number]    Date of Service: 2020    Discharge Recommendations:    Hernan Carrillo scored a 19/24 on the AM-PAC ADL Inpatient form. Current research shows that an AM-PAC score of 18 or greater is typically associated with a discharge to the patient's home setting. Based on the patient's AM-PAC score, and their current ADL deficits, it is recommended that the patient have 2-3 sessions per week of Occupational Therapy at d/c to increase the patient's independence. At this time, this patient demonstrates the endurance and safety to discharge home with [facility] and a follow up treatment frequency of 2-3x/wk. Please see assessment section for further patient specific details. If patient discharges prior to next session this note will serve as a discharge summary. Please see below for the latest assessment towards goals. OT Equipment Recommendations  Other: may benefit from reacher, sock aide; continued assessment pending progress    Assessment   Performance deficits / Impairments: Decreased functional mobility ; Decreased ADL status; Decreased endurance;Decreased strength;Decreased ROM  Assessment: Patient demonstrating good progress toward OT goals, requiring less assistance for functional transfers and improving ability to manage LB dressing with use of AE. Expect good progress as pain improves. Would continue to benefit from OT services, cont POC. Treatment Diagnosis: Impaired functional mobility, endurance, strength, and ADL status  Prognosis: Good  OT Education: Precautions; ADL Adaptive Strategies  Patient Education: use of LB AE - verb understanding  REQUIRES OT FOLLOW UP: Yes  Activity Tolerance  Activity Tolerance: Patient Tolerated treatment well  Safety Devices  Safety Devices in place: Yes  Type of devices: Call light within reach;Nurse notified; Bed alarm in place; Left in bed         Patient Diagnosis(es): The encounter diagnosis was S/P lumbar and lumbosacral fusion by anterior technique. has a past medical history of Arthritis, BPH (benign prostatic hyperplasia), and Colon polyps. has a past surgical history that includes TURP; Abdominal hernia repair; lumbar fusion (N/A, 7/9/2020); and lumbar fusion (N/A, 7/9/2020). Restrictions  Position Activity Restriction  Other position/activity restrictions: activity as tolerated, ambulate   Subjective   General  Chart Reviewed: Yes, Orders  Additional Pertinent Hx: 61y M w/ chronic back-leg pain, POD#1 L3-4 L4-5 ANTERIOR LUMBAR INTERBODY FUSION WITH PEDICLE SCREWS and exposure  Family / Caregiver Present: No  Referring Practitioner: JOAO Lauren  Diagnosis: spondylolisthesis of lumbar region  Subjective  Subjective: Patient supine in bed upon arrival, agreeable to OT services. Vital Signs  Patient Currently in Pain: (no pain at rest (RN provided pain meds prior to session), increase to 4-5 with mobility)   Orientation  Orientation  Overall Orientation Status: Within Functional Limits  Objective    ADL  Grooming: Contact guard assistance(oral care in stance at sink)  LE Dressing: Contact guard assistance(educated on use of reacher/sock aide to don socks, demo ability to complete.  Able to thread BLE into pants with multiple attempts without use of AE, steadying assist to pull up over hips)  Toileting: (declined need)  Balance  Sitting Balance: Independent  Standing Balance: Contact guard assistance  Standing Balance  Time: 7 min  Activity: functional mobility in hallway, grooming in stance at sink  Functional Mobility  Functional - Mobility Device: Rolling Walker  Activity: To/from bathroom(in hallway)  Assist Level: Contact guard assistance  Functional Mobility Comments: heavy reliance on UE for support, reports feeling that \"LLE\" is about to give out although no instability noted at knee; patient identifying

## 2020-07-11 NOTE — PROGRESS NOTES
Patient is alert and oriented. Vital signs are stable. Dressings are clean, dry, and intact. Patient's pain is controlled with medication per MAR. Patient ambulates x1 with a walker. Patient tolerates ambulation well. Patient is voiding urine without complication. Bed is in the lowest position. Bed alarm is activated. Call light is within reach. Will continue to monitor and reassess.

## 2020-07-11 NOTE — PROGRESS NOTES
Patient walked from room 5518 to [facility] in day room. Then from window made a small loop and back to his room. Pt at the end of his walked started to get unsteady. No needs at this time. Will continue to monitor.

## 2020-07-11 NOTE — CARE COORDINATION
Case Management Assessment           Daily Note                 Date/ Time of Note: 7/11/2020 12:29 PM         Note completed by: Jakob Varela Day    Patient Name: Maurice Page  YOB: 1955    Diagnosis:Spondylolisthesis of lumbar region [M43.16]  Spondylolisthesis of lumbar region [M43.16]  Patient Admission Status: Inpatient    Date of Admission:7/9/2020  5:34 AM Length of Stay: 2 GLOS:        Current Plan of Care: POD   ________________________________________________________________________________________  PT AM-PAC: 18 / 24 per last evaluation on: 7/10    OT AM-PAC: 18 / 24 per last evaluation on: 7/10    DME Needs for discharge: Jackson Yan already at bedside. ________________________________________________________________________________________  Discharge Plan: Home with [address]: [facility]  [phone] Fax [phone]. Tentative discharge date: 7/12    Current barriers to discharge: Medical Clearance by Dr. Mortimer Pack    Referrals completed: [address]: [facility]    Resources/ information provided: [address] List  ________________________________________________________________________________________  Case Management Notes: SW rounded on this date and reviewed chart. Patient not medically ready. Jackson Yan already at bedside. Will need to call [facility] at discharge and fax orders. They are available in chart. Family to transport. Carlyn Foley and his family were provided with choice of provider; he and his family are in agreement with the discharge plan. Care Transition Patient: No    Jakob Nicolass Day, MSW  [facility] - Weekend Coverage.    Case Management Department  Ph: [phone]

## 2020-07-12 VITALS
BODY MASS INDEX: 20.59 KG/M2 | SYSTOLIC BLOOD PRESSURE: 151 MMHG | WEIGHT: 152 LBS | DIASTOLIC BLOOD PRESSURE: 89 MMHG | TEMPERATURE: 98.8 F | RESPIRATION RATE: 18 BRPM | HEIGHT: 72 IN | OXYGEN SATURATION: 92 % | HEART RATE: 94 BPM

## 2020-07-12 PROCEDURE — 6360000002 HC RX W HCPCS: Performed by: PHYSICIAN ASSISTANT

## 2020-07-12 PROCEDURE — 6370000000 HC RX 637 (ALT 250 FOR IP): Performed by: PHYSICIAN ASSISTANT

## 2020-07-12 PROCEDURE — 6370000000 HC RX 637 (ALT 250 FOR IP): Performed by: NURSE PRACTITIONER

## 2020-07-12 PROCEDURE — 2580000003 HC RX 258: Performed by: PHYSICIAN ASSISTANT

## 2020-07-12 RX ADMIN — OXYCODONE 10 MG: 5 TABLET ORAL at 03:33

## 2020-07-12 RX ADMIN — ENOXAPARIN SODIUM 40 MG: 40 INJECTION SUBCUTANEOUS at 08:24

## 2020-07-12 RX ADMIN — DIAZEPAM 5 MG: 5 TABLET ORAL at 06:26

## 2020-07-12 RX ADMIN — Medication 10 ML: at 08:24

## 2020-07-12 RX ADMIN — DOCUSATE SODIUM 50 MG AND SENNOSIDES 8.6 MG 2 TABLET: 8.6; 5 TABLET, FILM COATED ORAL at 08:23

## 2020-07-12 RX ADMIN — OXYCODONE 10 MG: 5 TABLET ORAL at 08:23

## 2020-07-12 RX ADMIN — GABAPENTIN 800 MG: 400 CAPSULE ORAL at 08:23

## 2020-07-12 ASSESSMENT — PAIN SCALES - GENERAL
PAINLEVEL_OUTOF10: 10
PAINLEVEL_OUTOF10: 8
PAINLEVEL_OUTOF10: 0

## 2020-07-12 ASSESSMENT — PAIN DESCRIPTION - LOCATION: LOCATION: BACK

## 2020-07-12 ASSESSMENT — PAIN DESCRIPTION - PAIN TYPE: TYPE: SURGICAL PAIN

## 2020-07-12 NOTE — PROGRESS NOTES
No acute events overnight. BP remains slightly elevated. Pt reports pain controlled with rest, repositioning, and PRN pain medications. Pt SBA to BR via walker, tolerating ambulation well.

## 2020-07-12 NOTE — PLAN OF CARE
Problem: Falls - Risk of:  Goal: Will remain free from falls  Description: Will remain free from falls  Outcome: Ongoing   Pt is a high fall risk. High fall precautions are in place: nonskid socks, yellow blanket, fall wristband, call light in reach, bed is locked and in lowest position, bed alarm engaged. Problem: Skin Integrity:  Goal: Skin integrity will stabilize  Description: Skin integrity will stabilize  Outcome: Ongoing   Encourage to turn and reposition q 2 hrs.

## 2020-07-12 NOTE — PROGRESS NOTES
Physical Therapy Discharge    Attempted to see pt this AM for PT. Pt already D/Jimenez from hospital. RN reports pt was up and walking well prior to going home. See note 7/10 for D/C recommendations.        Cathryn Brown #[id number]

## 2020-07-12 NOTE — CARE COORDINATION
Case Management Assessment            Discharge Note                    Date / Time of Note: 7/12/2020 11:31 AM                  Discharge Note Completed by: Judishoshana Roe    Patient Name: Yocasta Almaraz   YOB: 1955  Diagnosis: Spondylolisthesis of lumbar region [M43.16]  Spondylolisthesis of lumbar region [M43.16]   Date / Time: 7/9/2020  5:34 AM    Current PCP: Yoselyn Fontanez MD  Clinic patient: No    Hospitalization in the last 30 days: No    Advance Directives:  Code Status: Full Code  [place] DNR form completed and on chart: No    Financial:  Payor: yTson Tate / Plan: Heiligensidneyisttammye 58 / Product Type: *No Product type* /      Pharmacy:    [address] [place] [address] 72.  [address] [place] [address]  Phone: [phone] Fax: [address] [address], [facility] [phone] [facility] [phone]  [address] [place] [id number]  Phone: [phone] Fax: [phone]      Assistance purchasing medications?: Potential Assistance Purchasing Medications: No  Assistance provided by Case Management: None at this time    Does patient want to participate in local refill/ meds to beds program?: No    Meds To Beds General Rules:  1. Can ONLY be done Monday- Friday between 8:30am-5pm  2. Prescription(s) must be in pharmacy by 3pm to be filled same day  3. Copy of patient's insurance/ prescription drug card and patient face sheet must be sent along with the prescription(s)  4. Cost of Rx cannot be added to hospital bill. If financial assistance is needed, please contact unit  or ;  or  CANNOT provide pharmacy voucher for patients co-pays  5. Patients can then  the prescription on their way out of the hospital at discharge, or pharmacy can deliver to the bedside if staff is available.

## 2020-07-12 NOTE — DISCHARGE SUMMARY
Neurosurgery Discharge Summary    Patient ID:   Crystal Llanos  [id number]  60 y.o.  1955    Admission Date: 7/9/2020    Discharge Date: 07/12/20    Reason for Admission:   Principal Diagnosis: Lumbar spondylosis  Secondary Diagnosis:   Patient Active Problem List   Diagnosis    ALEXANDRU (obstructive sleep apnea)    Chronic bilateral low back pain    Spondylolisthesis of lumbar region    S/P lumbar and lumbosacral fusion by anterior technique       Procedures:   L3-5 ALIF with PSF    Brief Summary of Patient's Course:   Patient admitted for abovementioned procedure. Patient tolerated the procedure well. Post-operatively admitted to floor where diet advanced and pain well controlled. Mobilized with PT/OT with recommendations for 24 hrs supervision. Leg pain significantly improved postoperatively. Discharged to home in stable condition on POD 3. Discharge Instructions:   -No bending, twisting, or lifting   -No strenuous activity   -Leave incision open to air. May shower. Do not soak incision, no tub baths   -Do not drive or operate heavy machinery while on narcotics   -Please call the office or return to ED for fever >101.5 or purulent drainage from wound     Diet:   common adult     Follow Up Instructions: To office in: in 2 weeks   Please call [phone] to confirm appointment     Condition on Discharge: good    Discharge Medications:     Medication List      START taking these medications    diazePAM 5 MG tablet  Commonly known as:  VALIUM  Take 1 tablet by mouth every 6 hours as needed (Muscle spasms) for up to 10 days. oxyCODONE 5 MG immediate release tablet  Commonly known as:  ROXICODONE  Take 1 tablet by mouth every 6 hours as needed for Pain for up to 7 days.      sennosides-docusate sodium 8.6-50 MG tablet  Commonly known as:  SENOKOT-S  Take 2 tablets by mouth 2 times daily for 15 days        CONTINUE taking these medications    dextrose 5 % SOLN 49 mL with alprostadil 500 MCG/ML SOLN 500 mcg     gabapentin 800 MG tablet  Commonly known as:  NEURONTIN     lidocaine 5 %  Commonly known as:  LIDODERM     tiZANidine 4 MG tablet  Commonly known as:  ZANAFLEX  Take 1 tablet by mouth 4 times daily as needed (muscle spasms)     vitamin D 1.25 MG (97608 UT) Caps capsule  Commonly known as:  ERGOCALCIFEROL  Take 1 capsule by mouth once a week        STOP taking these medications    cyclobenzaprine 10 MG tablet  Commonly known as:  FLEXERIL     Mobic 15 MG tablet  Generic drug:  meloxicam           Where to Get Your Medications      You can get these medications from any pharmacy    Bring a paper prescription for each of these medications  · diazePAM 5 MG tablet  · oxyCODONE 5 MG immediate release tablet  · sennosides-docusate sodium 8.6-50 MG tablet         Allergies:  No Known Allergies      Emiliano Hamman, MD, PhD  [address], [address], [address] [phone] (c), [phone] (o)   7/12/2020  10:43 AM

## 2020-07-12 NOTE — PROGRESS NOTES
NEUROSURGERY POST-OP PROGRESS NOTE    Patient Name: Bharti Dowling YOB: 1955   Sex: Male Age: 60 yrs     Medical Record Number: [id number] Acct Number: [de-identified]   Room Number: 9647/3344-66 Hospital Day: Hospital Day: 4     Interval History:  Post-operative Day# 3 s/p Procedure(s) (LRB):  L3-4, L4-5 ANTERIOR LUMBAR INTERBODY FUSION WITH (N/A)  PEDICLE SCREW FIXATION (N/A)  EXPOSURE FOR ANTERIOR LUMBAR INTERBODY FUSION (N/A)    Subjective: No acute events overnight. Pain significantly improved. Objective:    VITAL SIGNS   BP (!) 151/89   Pulse 94   Temp 98.8 °F (37.1 °C) (Oral)   Resp 18   Ht 6' (1.829 m)   Wt 152 lb (68.9 kg)   SpO2 92%   BMI 20.61 kg/m²    Height Height: 6' (182.9 cm)   Weight Weight: 152 lb (68.9 kg)        Allergies No Known Allergies   NPO Status DIET GENERAL;   Isolation No active isolations     LABS   Basic Metabolic Profile No results for input(s): NA, CL, CO2, BUN, CREATININE, GLUCOSE, ALB, PHOS, MG in the last 72 hours. Invalid input(s): POTASSIUM, CA   Complete Blood Count Recent Labs     07/10/20  0508   WBC 10.8   RBC 4.78      Coagulation Studies No results for input(s): PTT, INR in the last 72 hours. Invalid input(s): PLATELETS, PROA, PT, PTTA     MEDICATIONS   Inpatient Medications     sennosides-docusate sodium, 2 tablet, Oral, BID    lidocaine, 1 patch, Transdermal, Daily    gabapentin, 800 mg, Oral, BID    [START ON 7/14/2020] vitamin D, 50,000 Units, Oral, Weekly    sodium chloride flush, 10 mL, Intravenous, 2 times per day    enoxaparin, 40 mg, Subcutaneous, Daily   Infusions      Antibiotics   Recent Abx Admin      No antibiotic orders with administrations found.                  Neurologic Exam:  Mental status: awake and alert and oriented x4    Musculoskeletal:   Gait: Not tested   Tone: normal  Sensory: intact to all extremities  Motor strength:    Right  Left    Right  Left    Deltoid  5 5  Hip Flex  5 5   Biceps  5 5  Knee Extensors  5 5   Triceps  5 5  Knee Flexors  5 5   Wrist Ext  5 5  Ankle Dorsiflex. 5 5   Wrist Flex  5 5  Ankle Plantarflex. 5 5   Handgrip  5 5  Ext Gurmeet Longus  5 5   Thumb Ext  5 5         Incision: intact, clean and dry      Respiratory:  Unlabored respiratory pattern    Abdomen:   Soft, ND   Has not passed gas    Cardiovascular:  Warm, well perfused    Assessment   Patient is a 59 yo M s/p Procedure(s) (LRB):  L3-4, L4-5 ANTERIOR LUMBAR INTERBODY FUSION WITH (N/A)  PEDICLE SCREW FIXATION (N/A)  EXPOSURE FOR ANTERIOR LUMBAR INTERBODY FUSION (N/A) per Dr. Connolly Schwab:  1. Neurologic exam frequency:q4  2. Mobility:PT/OT eval  3. DVT Prophylaxis: SCDs and lovenox  4. Bowel Regimen: dulcolax and senna  5. Pain control:carlos and scheduled tylenol, valium  6. Incisional Care: open to air and shower  7.  Plan to dc to home today      Galina Sierra MD, PhD  [facility]  [address] [place] [address], [address], [address] [phone] (c), [phone] (o)

## 2020-07-12 NOTE — PROGRESS NOTES
Pt was discharged home with family. All teaching provided. All questions answered. All belongings and supplies with patient.

## 2020-07-13 ENCOUNTER — APPOINTMENT (OUTPATIENT)
Dept: CT IMAGING | Age: 65
End: 2020-07-13
Payer: MEDICAID

## 2020-07-13 ENCOUNTER — HOSPITAL ENCOUNTER (EMERGENCY)
Age: 65
Discharge: HOME | End: 2020-07-13
Attending: EMERGENCY MEDICINE
Payer: MEDICAID

## 2020-07-13 VITALS
HEIGHT: 72 IN | SYSTOLIC BLOOD PRESSURE: 146 MMHG | BODY MASS INDEX: 20.59 KG/M2 | RESPIRATION RATE: 16 BRPM | OXYGEN SATURATION: 96 % | WEIGHT: 152 LBS | HEART RATE: 84 BPM | DIASTOLIC BLOOD PRESSURE: 87 MMHG | TEMPERATURE: 98.4 F

## 2020-07-13 LAB
ALBUMIN SERPL-MCNC: 3.5 G/DL (ref 3.4–5)
ALP BLD-CCNC: 62 U/L (ref 40–129)
ALT SERPL-CCNC: 15 U/L (ref 10–40)
AMPHETAMINE SCREEN, URINE: ABNORMAL
ANION GAP SERPL CALCULATED.3IONS-SCNC: 8 MMOL/L (ref 3–16)
AST SERPL-CCNC: 26 U/L (ref 15–37)
BARBITURATE SCREEN URINE: ABNORMAL
BASE EXCESS VENOUS: 6.2 MMOL/L (ref -2–3)
BASOPHILS ABSOLUTE: 0 K/UL (ref 0–0.2)
BASOPHILS RELATIVE PERCENT: 0.5 %
BENZODIAZEPINE SCREEN, URINE: ABNORMAL
BILIRUB SERPL-MCNC: 1.3 MG/DL (ref 0–1)
BILIRUBIN DIRECT: 0.3 MG/DL (ref 0–0.3)
BILIRUBIN URINE: NEGATIVE
BILIRUBIN, INDIRECT: 1 MG/DL (ref 0–1)
BLOOD, URINE: NEGATIVE
BUN BLDV-MCNC: 5 MG/DL (ref 7–20)
CALCIUM SERPL-MCNC: 9.4 MG/DL (ref 8.3–10.6)
CANNABINOID SCREEN URINE: ABNORMAL
CARBOXYHEMOGLOBIN: 7.2 % (ref 0–1.5)
CHLORIDE BLD-SCNC: 96 MMOL/L (ref 99–110)
CHP ED QC CHECK: YES
CLARITY: CLEAR
CO2: 31 MMOL/L (ref 21–32)
COCAINE METABOLITE SCREEN URINE: POSITIVE
COLOR: YELLOW
CREAT SERPL-MCNC: 0.6 MG/DL (ref 0.8–1.3)
EOSINOPHILS ABSOLUTE: 0.2 K/UL (ref 0–0.6)
EOSINOPHILS RELATIVE PERCENT: 2.5 %
ETHANOL: NORMAL MG/DL (ref 0–0.08)
GFR AFRICAN AMERICAN: >60
GFR NON-AFRICAN AMERICAN: >60
GLUCOSE BLD-MCNC: 119 MG/DL
GLUCOSE BLD-MCNC: 119 MG/DL (ref 70–99)
GLUCOSE BLD-MCNC: 120 MG/DL (ref 70–99)
GLUCOSE URINE: NEGATIVE MG/DL
HCO3 VENOUS: 32.3 MMOL/L (ref 24–28)
HCT VFR BLD CALC: 41.2 % (ref 40.5–52.5)
HEMOGLOBIN, VEN, REDUCED: 17.2 %
HEMOGLOBIN: 13.7 G/DL (ref 13.5–17.5)
INR BLD: 1.09 (ref 0.86–1.14)
KETONES, URINE: NEGATIVE MG/DL
LEUKOCYTE ESTERASE, URINE: NEGATIVE
LIPASE: 25 U/L (ref 13–60)
LYMPHOCYTES ABSOLUTE: 1 K/UL (ref 1–5.1)
LYMPHOCYTES RELATIVE PERCENT: 15.3 %
Lab: ABNORMAL
MCH RBC QN AUTO: 31.8 PG (ref 26–34)
MCHC RBC AUTO-ENTMCNC: 33.2 G/DL (ref 31–36)
MCV RBC AUTO: 95.5 FL (ref 80–100)
METHADONE SCREEN, URINE: ABNORMAL
METHEMOGLOBIN VENOUS: 0.4 % (ref 0–1.5)
MICROSCOPIC EXAMINATION: ABNORMAL
MONOCYTES ABSOLUTE: 0.8 K/UL (ref 0–1.3)
MONOCYTES RELATIVE PERCENT: 12.7 %
NEUTROPHILS ABSOLUTE: 4.3 K/UL (ref 1.7–7.7)
NEUTROPHILS RELATIVE PERCENT: 69 %
NITRITE, URINE: NEGATIVE
O2 SAT, VEN: 81 %
OPIATE SCREEN URINE: ABNORMAL
OXYCODONE URINE: POSITIVE
PCO2, VEN: 54.8 MMHG (ref 41–51)
PDW BLD-RTO: 13 % (ref 12.4–15.4)
PERFORMED ON: ABNORMAL
PH UA: 8
PH UA: 8 (ref 5–8)
PH VENOUS: 7.39 (ref 7.35–7.45)
PHENCYCLIDINE SCREEN URINE: ABNORMAL
PLATELET # BLD: 258 K/UL (ref 135–450)
PMV BLD AUTO: 8.4 FL (ref 5–10.5)
PO2, VEN: 45.6 MMHG (ref 25–40)
POTASSIUM REFLEX MAGNESIUM: 3.9 MMOL/L (ref 3.5–5.1)
PROPOXYPHENE SCREEN: ABNORMAL
PROTEIN UA: NEGATIVE MG/DL
PROTHROMBIN TIME: 12.6 SEC (ref 10–13.2)
RBC # BLD: 4.32 M/UL (ref 4.2–5.9)
SODIUM BLD-SCNC: 135 MMOL/L (ref 136–145)
SPECIFIC GRAVITY UA: 1.01 (ref 1–1.03)
TCO2 CALC VENOUS: 34 MMOL/L
TOTAL PROTEIN: 7 G/DL (ref 6.4–8.2)
URINE TYPE: ABNORMAL
UROBILINOGEN, URINE: >=8 E.U./DL
WBC # BLD: 6.2 K/UL (ref 4–11)

## 2020-07-13 PROCEDURE — 85025 COMPLETE CBC W/AUTO DIFF WBC: CPT

## 2020-07-13 PROCEDURE — 80076 HEPATIC FUNCTION PANEL: CPT

## 2020-07-13 PROCEDURE — 81003 URINALYSIS AUTO W/O SCOPE: CPT

## 2020-07-13 PROCEDURE — 96374 THER/PROPH/DIAG INJ IV PUSH: CPT

## 2020-07-13 PROCEDURE — G0480 DRUG TEST DEF 1-7 CLASSES: HCPCS

## 2020-07-13 PROCEDURE — 80048 BASIC METABOLIC PNL TOTAL CA: CPT

## 2020-07-13 PROCEDURE — 80307 DRUG TEST PRSMV CHEM ANLYZR: CPT

## 2020-07-13 PROCEDURE — 99284 EMERGENCY DEPT VISIT MOD MDM: CPT

## 2020-07-13 PROCEDURE — 83690 ASSAY OF LIPASE: CPT

## 2020-07-13 PROCEDURE — 2580000003 HC RX 258: Performed by: EMERGENCY MEDICINE

## 2020-07-13 PROCEDURE — 85610 PROTHROMBIN TIME: CPT

## 2020-07-13 PROCEDURE — 74177 CT ABD & PELVIS W/CONTRAST: CPT

## 2020-07-13 PROCEDURE — 6360000002 HC RX W HCPCS: Performed by: EMERGENCY MEDICINE

## 2020-07-13 PROCEDURE — 6360000004 HC RX CONTRAST MEDICATION: Performed by: NURSE PRACTITIONER

## 2020-07-13 PROCEDURE — 82803 BLOOD GASES ANY COMBINATION: CPT

## 2020-07-13 RX ORDER — NALOXONE HYDROCHLORIDE 0.4 MG/ML
INJECTION, SOLUTION INTRAMUSCULAR; INTRAVENOUS; SUBCUTANEOUS
Status: DISCONTINUED
Start: 2020-07-13 | End: 2020-07-13 | Stop reason: HOSPADM

## 2020-07-13 RX ORDER — NALOXONE HYDROCHLORIDE 0.4 MG/ML
0.2 INJECTION, SOLUTION INTRAMUSCULAR; INTRAVENOUS; SUBCUTANEOUS ONCE
Status: COMPLETED | OUTPATIENT
Start: 2020-07-13 | End: 2020-07-13

## 2020-07-13 RX ORDER — ACETAMINOPHEN 500 MG
1000 TABLET ORAL 3 TIMES DAILY PRN
Qty: 60 TABLET | Refills: 0 | Status: SHIPPED | OUTPATIENT
Start: 2020-07-13

## 2020-07-13 RX ORDER — IBUPROFEN 600 MG/1
600 TABLET ORAL EVERY 6 HOURS PRN
Qty: 30 TABLET | Refills: 0 | Status: SHIPPED | OUTPATIENT
Start: 2020-07-13

## 2020-07-13 RX ORDER — 0.9 % SODIUM CHLORIDE 0.9 %
1000 INTRAVENOUS SOLUTION INTRAVENOUS ONCE
Status: COMPLETED | OUTPATIENT
Start: 2020-07-13 | End: 2020-07-13

## 2020-07-13 RX ADMIN — SODIUM CHLORIDE 1000 ML: 9 INJECTION, SOLUTION INTRAVENOUS at 16:21

## 2020-07-13 RX ADMIN — IOPAMIDOL 75 ML: 755 INJECTION, SOLUTION INTRAVENOUS at 17:27

## 2020-07-13 RX ADMIN — NALOXONE HYDROCHLORIDE 0.2 MG: 0.4 INJECTION, SOLUTION INTRAMUSCULAR; INTRAVENOUS; SUBCUTANEOUS at 16:14

## 2020-07-13 ASSESSMENT — PAIN DESCRIPTION - LOCATION: LOCATION: ABDOMEN;BACK

## 2020-07-13 ASSESSMENT — PAIN SCALES - GENERAL: PAINLEVEL_OUTOF10: 6

## 2020-07-13 NOTE — ED PROVIDER NOTES
[address] [facility] ENCOUNTER          ATTENDING PHYSICIAN NOTE       Date of evaluation: 7/13/2020    Chief Complaint     Back Pain (post op pain) and Abdominal Pain (nausea; post op pain)      History of Present Illness     Darryl Vela is a 60 y.o. male who presents plan of nausea and somnolence. The patient was discharged yesterday after lumbar spinal surgery on tizanidine and Percocet. He denies taking excess pain medicine, only what is been prescribed. His family reports he been having nausea and vomiting of unable to keep medicine down. The patient himself also complains intra-abdominal pain but states that he is hungry. He has back pain, but reports he has been able to ambulate although he has been too dehydrated to eat or drink. Denies any alcohol abuse. Review of Systems     Review of Systems   Unable to perform ROS: Mental status change       Past Medical, Surgical, Family, and Social History     He has a past medical history of Arthritis, BPH (benign prostatic hyperplasia), and Colon polyps. He has a past surgical history that includes TURP; Abdominal hernia repair; lumbar fusion (N/A, 7/9/2020); and lumbar fusion (N/A, 7/9/2020). His family history includes Cancer in his father; Dementia in his sister; Diabetes in his brother, brother, and mother; Heart Attack in his brother and father; Stroke in his brother. He reports that he has been smoking cigarettes. He has a 24.00 pack-year smoking history. He has never used smokeless tobacco. He reports current alcohol use. He reports that he does not use drugs.     Medications     Discharge Medication List as of 7/13/2020  6:58 PM      CONTINUE these medications which have NOT CHANGED    Details   sennosides-docusate sodium (SENOKOT-S) 8.6-50 MG tablet Take 2 tablets by mouth 2 times daily for 15 days, Disp-30 tablet,R-0Print      tiZANidine (ZANAFLEX) 4 MG tablet Take 1 tablet by mouth 4 times daily as needed (muscle spasms), Disp-40 tablet, R-0Normal      vitamin D (ERGOCALCIFEROL) 1.25 MG (31528 UT) CAPS capsule Take 1 capsule by mouth once a week, Disp-12 capsule, R-0Normal      gabapentin (NEURONTIN) 800 MG tablet Take 800 mg by mouth 2 times daily. Historical Med      lidocaine (LIDODERM) 5 % Place 1 patch onto the skin daily 12 hours on, 12 hours off. Historical Med      dextrose 5 % SOLN 49 mL with alprostadil 500 MCG/ML SOLN 500 mcg Infuse 0.01 mcg/kg/min intravenously continuous Inject 10 mcg intravenously as needed for use prior to sexual activityHistorical Med             Allergies     He has No Known Allergies. Physical Exam     INITIAL VITALS: BP: (!) 142/89, Temp: 98.4 °F (36.9 °C), Pulse: 115, Resp: 16, SpO2: 94 %   Physical Exam   Constitutional: Well nourished middle-aged -American male who appears somewhat somnolent and slow to arouse. HEENT: NC/AT. Pupils are 2 mm and minimally reactive bilaterally with a disconjugate gaze. CV:Heart is regular rate and rhythm without murmurs, rubs or gallops. Resp: Respirations unlabored. Lungs clear to auscultation w/o wheezing. Abd: Soft, nontender, nondistended. MSK: Full ROM, no edema or tenderness to palpation. Incision site is clean dry and intact without purulent discharge. Skin: Extremities are warm and well perfused. 2+ radial pulses . Cap Refill <3 seconds. Neuro: He is oriented to self and location after persistent questioning. He is somewhat somnolent and falls asleep but will follow commands in all extremities. Strength and sensation intact x4 extremities. Psych: Thought content, behavior & judgement normal.      Diagnostic Results         RADIOLOGY:  CT ABDOMEN PELVIS W IV CONTRAST Additional Contrast? Radiologist Recommendation   Final Result      Postoperative changes in the retroperitoneum and abdominal wall secondary to L3-L5 posterior and interbody fusion. Extensive circumferential bladder wall thickening. Microscopic Examination Not Indicated     Urine Type Voided    Blood gas, venous (Lab)   Result Value Ref Range    pH, Fermin 7.388 7.350 - 7.450    pCO2, Fermin 54.8 (H) 41.0 - 51.0 mmHg    pO2, Fermin 45.6 (H) 25.0 - 40.0 mmHg    HCO3, Venous 32.3 (H) 24.0 - 28.0 mmol/L    Base Excess, Fermin 6.2 (H) -2.0 - 3.0 mmol/L    O2 Sat, Fermin 81 Not established %    Carboxyhemoglobin 7.2 (H) 0.0 - 1.5 %    MetHgb, Fermin 0.4 0.0 - 1.5 %    TC02 (Calc), Fermin 34 mmol/L    Hemoglobin, Fermin, Reduced 17.20 %   Ethanol   Result Value Ref Range    Ethanol Lvl None Detected mg/dL   Urine Drug Screen   Result Value Ref Range    Amphetamine Screen, Urine Neg Negative <1000ng/mL    Barbiturate Screen, Ur Neg Negative <200 ng/mL    Benzodiazepine Screen, Urine Neg Negative <200 ng/mL    Cannabinoid Scrn, Ur Neg Negative <50 ng/mL    Cocaine Metabolite Screen, Urine POSITIVE (A) Negative <300 ng/mL    Opiate Scrn, Ur Neg Negative <300 ng/mL    PCP Screen, Urine Neg Negative <25 ng/mL    Methadone Screen, Urine Neg Negative <300 ng/mL    Propoxyphene Scrn, Ur Neg Negative <300 ng/mL    Oxycodone Urine POSITIVE (A) Negative <100 ng/ml    pH, UA 8.0     Drug Screen Comment: see below    Hepatic Function Panel   Result Value Ref Range    Total Protein 7.0 6.4 - 8.2 g/dL    Alb 3.5 3.4 - 5.0 g/dL    Alkaline Phosphatase 62 40 - 129 U/L    ALT 15 10 - 40 U/L    AST 26 15 - 37 U/L    Total Bilirubin 1.3 (H) 0.0 - 1.0 mg/dL    Bilirubin, Direct 0.3 0.0 - 0.3 mg/dL    Bilirubin, Indirect 1.0 0.0 - 1.0 mg/dL   Lipase   Result Value Ref Range    Lipase 25.0 13.0 - 60.0 U/L   POCT Glucose   Result Value Ref Range    Glucose 119 mg/dL    QC OK?  yes    POCT Glucose   Result Value Ref Range    POC Glucose 119 (H) 70 - 99 mg/dl    Performed on ACCU-CHEK            RECENT VITALS:  BP: (!) 146/87,Temp: 98.4 °F (36.9 °C), Pulse: 84, Resp: 16, SpO2: 96 %     Procedures         ED Course     Nursing Notes, Past Medical Hx, Past Surgical Hx, Social Hx,Allergies, and Family Hx were reviewed. patient was given the following medications:  Orders Placed This Encounter   Medications    naloxone (NARCAN) injection 0.2 mg    0.9 % sodium chloride bolus    DISCONTD: naloxone (NARCAN) 0.4 MG/ML injection     TOÑA OVIEDO: cabinet override    iopamidol (ISOVUE-370) 76 % injection 75 mL    ibuprofen (ADVIL;MOTRIN) 600 MG tablet     Sig: Take 1 tablet by mouth every 6 hours as needed for Pain     Dispense:  30 tablet     Refill:  0    acetaminophen (TYLENOL) 500 MG tablet     Sig: Take 2 tablets by mouth 3 times daily as needed for Pain     Dispense:  60 tablet     Refill:  0       CONSULTS:  IP CONSULT TO NEUROSURGERY    MEDICAL DECISIONMAKING / ASSESSMENT / PLAN     Patricia Herrera is a 60 y.o. male ending with a complaint of somnolence from home. Initially his pupils were pinpoint, and he was minimally responsive repeated questioning, and he was given 0.2 mg of Narcan with immediate improvement in symptoms. He then started talking more and complaining of some nausea and requesting to eat. His wound appeared clean, with no signs of infection, and his labs were unremarkable for postoperative state. He did tell me that he had been taking his Valium and Percocet scheduled rather than as needed. He was advised to stop taking these medicines unless he absolutely needed them. It is also possible he was taking tizanidine on top of this as it was prescribed for him as well. Neurosurgery was consulted and recommended CT which showed unremarkable postoperative course. Patient ate well, and was ambulatory at time of discharge. He will follow-up with his surgeon for routine postop care.         Clinical Impression     1. Episodic overuse of medication [facility])        Disposition     PATIENT REFERRED TO:  [facility] Emergency Department  [address] [place] [id number]  [phone]    If symptoms worsen    Kate Mobley MD  [address] [facility]

## 2020-07-13 NOTE — CONSULTS
Neurosurgery Consult:    Patient Name: Lexx Gerber YOB: 1955   Sex: Male Age: 60 yrs     Medical Record Number: [id number] Acct Number: [de-identified]   Room Number: G18/U74-31 Hospital Day: Hospital Day: 1     Requesting physician: No admitting provider for patient encounter. Reason for consultation: recent L3-4 ALIF - presented to ED w/ pain / nausea    History of present illness: Patient is a 60 y.o. male w/ PMH of BPH, arthritis who presented on 7/13/2020 with nausea and pain s/p L3-4 ALIF on 7/9/2020. He was doing great post operatively and was discharged to home with family support yesterday. Per report patient was having nausea and vomiting throughout night, unable to keep anything down. Patient is currently somnolent, he will wake to answer some questions however does not seem to be reliable in responses. He denies have any fevers or chills currently. Given narcan on admission to ED - with some improvement in mental status, but currently not back to baseline as of now. ROS:   GENERAL:  Denies fever or recent illness. Denies weight changes   EYES:  Denies vision change or diplopia  EARS:  Denies hearing loss  CARDIAC:  Denies chest pain  RESPIRATORY:  Denies shortness of breath  SKIN:  Denies rash or lesions   HEM:  Denies excessive bruising  PSYCH:  Denies anxiety or depression  NEURO:  Denies headache, numbness or tingling or lateralizing weakness   :  Denies urinary difficulty  GI: Positive nausea / vomiting. Denies constipation.  Recent L3-4 ALIF  MUSCULOSKELETAL:  No arthralgias      VITAL SIGNS   BP (!) 148/87   Pulse 101   Temp 98.4 °F (36.9 °C) (Oral)   Resp 22   Ht 6' (1.829 m)   Wt 152 lb (68.9 kg)   SpO2 97%   BMI 20.61 kg/m²    Height Height: 6' (182.9 cm)   Weight Weight: 152 lb (68.9 kg)        Allergies No Known Allergies   NPO Status No diet orders on file   Isolation No active isolations     MEDICAL HISTORY   Past Medical History       Diagnosis Date    Arthritis     BPH (benign prostatic hyperplasia)     Colon polyps       Surgical History    has a past surgical history that includes TURP; Abdominal hernia repair; lumbar fusion (N/A, 7/9/2020); and lumbar fusion (N/A, 7/9/2020). Social History   Social History     Occupational History    Not on file   Tobacco Use    Smoking status: Current Every Day Smoker     Packs/day: 0.50     Years: 48.00     Pack years: 24.00     Types: Cigarettes    Smokeless tobacco: Never Used   Substance and Sexual Activity    Alcohol use: Yes     Frequency: 2-4 times a month     Comment: 1 per week    Drug use: Never    Sexual activity: Yes        The medical history was obtained from the patient & the medical records. The nursing notes, primary physician's notes, and old charts (if applicable) were reviewed. LABS   Basic Metabolic Profile Recent Labs     07/13/20  1617   GLUCOSE 119      Complete Blood Count Recent Labs     07/13/20  1609   WBC 6.2   RBC 4.32      Coagulation Studies No results for input(s): PTT, INR in the last 72 hours. Invalid input(s): PLATELETS, PROA, PT, PTTA     MEDICATIONS   Inpatient Medications     sodium chloride, 1,000 mL, Intravenous, Once    naloxone, , ,    Infusions      PRN       Antibiotics   Recent Abx Admin      No antibiotic orders with administrations found. PHYSICAL EXAMINATION     Patient seen and examined   General: Well developed. Sleeping in ED     HENT: atraumatic, neck supple  Eyes: Optic discs: Not tested  Pulmonary: unlabored respiratory effort  Cardiovascular:  Warm well perfused. No peripheral edema  Gastrointestinal: abdomen soft, midline incision w/ dermabond. Mild redness surrounding incision w/ questionable swelling, difficult to assess on abdomen. No drainage from incision, edges well approximated.      Neurologic Exam:  Neurological:  Mental Status: somnolent  Attention: Intact  Language: No aphasia or dysarthria noted  Sensation: Intact to all extremities to light touch  Coordination: Intact    Musculoskeletal:   Gait: Not tested   Assist devices: None   Tone: normal   Motor strength:   Moves all extremities, no focal weakness noted. IMAGING   None currently  ASSESSMENT & PLAN   Patient is a 59 y/o M w/ nausea / pain / somnolence s/p L3-4 ALIF on 7/9/2020    Plan:  1. CT of abdomen. 2. Pain control as able w/ mental status  3. Dispo plan pending CT results    Patient was d/w with Dr. Michelle Andrade who agrees with above assessment and plan.      Electronically signed by: Kenney Figueroa, 7/13/2020 4:39 PM  Neurosurgery Nurse Practitioner  Phone: [phone]

## 2020-07-13 NOTE — ED NOTES
Bed: B20-20  Expected date:   Expected time:   Means of arrival:   Comments:  polly Gomez RN  07/13/20 0801

## 2020-07-14 ENCOUNTER — CARE COORDINATION (OUTPATIENT)
Dept: CARE COORDINATION | Age: 65
End: 2020-07-14

## 2020-07-14 NOTE — CARE COORDINATION
Patient contacted regarding Desiree Meza. Discussed COVID-19 related testing which was available at this time. Test results were negative. Patient informed of results, if available? YES, already aware from     Care Transition Nurse/ [address] [place] [address] contacted the patient by telephone to perform post discharge assessment. Verified name and  with family as identifiers. Provided introduction to self, and explanation of the CTN/ACM role, and reason for call due to risk factors for infection and/or exposure to COVID-19. Symptoms reviewed with patient who verbalized the following symptoms: no new symptoms and no worsening symptoms. Due to no new or worsening symptoms encounter was not routed to provider for escalation. Discussed follow-up appointments. If no appointment was previously scheduled, appointment scheduling offered: [facility] follow up appointment(s): No future appointments. Non-[facility] follow up appointment(s):       Patient has following risk factors of: immunocompromised. CTN/ACM reviewed discharge instructions, medical action plan and red flags such as increased shortness of breath, increasing fever and signs of decompensation with patient who verbalized understanding. Discussed exposure protocols and quarantine with CDC Guidelines What to do if you are sick with coronavirus disease 2019.  Patient was given an opportunity for questions and concerns. The patient agrees to contact the [facility] exposure line [phone], local [facility] department [place] [facility]: ([phone]) and PCP office for questions related to their healthcare. CTN/ACM provided contact information for future needs.     Reviewed and educated patient on any new and changed medications related to discharge diagnosis     Patient/family/caregiver given information for [employer] Loop and agrees to enroll no  Patient's preferred e-mail:    Patient's preferred phone number:   Based on Loop alert triggers, patient will be contacted by nurse care manager for worsening symptoms. No further outreach planned based on severity of symptoms and risk factors. l

## 2020-07-14 NOTE — OP NOTE
Operative Report    PATIENT NAME: Ilan Flynn  YOB: 1955  MEDICAL RECORD# [id number]  SURGERY DATE: 7/9/2020  SURGEON:  Vincenzo Boo MD, PhD  ASSISTANT:  Michelle Hernandez PA-C. No qualified resident or fellow was available for this case. She provided assistance with opening/closure, placement of implants and protection of critical anatomy during the posterior portion of the operation. DICTATED BY: Vincenzo Boo MD, PhD      PREOPERATIVE DIAGNOSIS:  1. Spondylolisthesis L3-4 and L4-5 associated with subarticular stenosis and radiculopathy     POSTOPERATIVE DIAGNOSIS:  1.   Same. PROCEDURES PERFORMED:  1. Anterior diskectomy at L4-5, retroperitoneal approach performed by Dr. Rob Palomino with interbody fusion   2. Placement of Synthes SynCage ALIF cage with a medium footprint packed with Infuse into the L4-5 interspace  3. Anterior diskectomy at L3-4, retroperitoneal approach performed by Dr. Rob Palomino with interbody fusion   4. Placement of Synthes SynCage ALIF cage with a medium footprint packed with Infuse into the L3-4 interspace  5. Placement of percutaneous Viper Prime pedicle screws and rods into the pedicles of L3-5 bilaterally  6. Posterolateral facet arthrodesis at L3-5  7. Intraoperative monitoring for motor and sensory potentials, stimulation of lumbosacral plexus, pedicle screw stimulation  8. Computer aided, stereotactic guidance using [employer] Airo     ANESTHESIA:  General     IMPLANTS:  1. Synthes SynCage ALIF grafts at L4-5 and L5-S1  a. L3-4: 12 mm, 10 degree Syncage medium foot print  b. L4-5: 13.5 mm, 10 degree Syncage medium foot print  2. Synthes cancellous retaining screws and washer into the body of L4 and L5  3. Viper Prime pedicle screws    INDICATIONS FOR SURGERY:  The patient is a 60 y.o. with intractable back and leg pain. Their symptoms failed to respond to conservative intervention.  An MRI scan was performed and this demonstrated evidence of reduce the spondylolisthesis. A compression maneuver was then performed to lock the interbody graft in place. The wound was then irrigated with antibiotic solution and was then closed in the usual fashion using interrupted 0 Vicryl sutures in the fascia, followed by running 4-0 Monocryl in the subcuticular layer. A Dermabond dressing was then applied. The patient was extubated in the operating room and transferred to the recovery room in stable condition. There were no complications. All needle, instrument, and sponge counts were correct. In accordance with CMS guidelines, I attest that I was present for the entire procedure from the creation of the skin incision to the closure. ESTIMATED BLOOD LOSS: 048 mL     COMPLICATIONS: No complications apparent. DISPOSITION: The patient was transferred to the PACU in stable condition, awake, alert, and moving all extremities on command.      Dictated by: Ila Guzman MD

## 2020-07-16 ENCOUNTER — TELEPHONE (OUTPATIENT)
Dept: INTERNAL MEDICINE CLINIC | Age: 65
End: 2020-07-16

## 2020-07-16 NOTE — TELEPHONE ENCOUNTER
[facility] Initial Follow Up Call    Outreach made within 2 business days of discharge: Yes    Patient: Crystal Llanos Patient : 1955   MRN: [id number]  Reason for Admission: There are no discharge diagnoses documented for the most recent discharge. Discharge Date: 20       Spoke with: Teri Stevens    Discharge department/facility: [facility]    TCM Interactive Patient Contact:  Was patient able to fill all prescriptions: Yes  Was patient instructed to bring all medications to the follow-up visit: Yes  Is patient taking all medications as directed in the discharge summary?  Yes  Does patient understand their discharge instructions: Yes  Does patient have questions or concerns that need addressed prior to 7-14 day follow up office visit: no    Scheduled appointment with PCP within 7-14 days    Follow Up  Future Appointments   Date Time Provider Kierra Mcqueen   2020  1:20 PM Dex Carrizales MD [facility] [address] [place], [place]

## 2020-07-17 ENCOUNTER — TELEPHONE (OUTPATIENT)
Dept: INTERNAL MEDICINE CLINIC | Age: 65
End: 2020-07-17

## 2020-07-20 ENCOUNTER — OFFICE VISIT (OUTPATIENT)
Dept: INTERNAL MEDICINE CLINIC | Age: 65
End: 2020-07-20
Payer: MEDICAID

## 2020-07-20 VITALS
SYSTOLIC BLOOD PRESSURE: 140 MMHG | WEIGHT: 152.2 LBS | BODY MASS INDEX: 20.62 KG/M2 | HEIGHT: 72 IN | DIASTOLIC BLOOD PRESSURE: 78 MMHG | TEMPERATURE: 99.2 F

## 2020-07-20 PROCEDURE — G8420 CALC BMI NORM PARAMETERS: HCPCS | Performed by: INTERNAL MEDICINE

## 2020-07-20 PROCEDURE — 3017F COLORECTAL CA SCREEN DOC REV: CPT | Performed by: INTERNAL MEDICINE

## 2020-07-20 PROCEDURE — 1111F DSCHRG MED/CURRENT MED MERGE: CPT | Performed by: INTERNAL MEDICINE

## 2020-07-20 PROCEDURE — 99213 OFFICE O/P EST LOW 20 MIN: CPT | Performed by: INTERNAL MEDICINE

## 2020-07-20 PROCEDURE — G8427 DOCREV CUR MEDS BY ELIG CLIN: HCPCS | Performed by: INTERNAL MEDICINE

## 2020-07-20 PROCEDURE — 4004F PT TOBACCO SCREEN RCVD TLK: CPT | Performed by: INTERNAL MEDICINE

## 2020-07-20 RX ORDER — TIZANIDINE 4 MG/1
4 TABLET ORAL 4 TIMES DAILY PRN
Qty: 40 TABLET | Refills: 1 | Status: SHIPPED | OUTPATIENT
Start: 2020-07-20

## 2020-07-20 ASSESSMENT — ENCOUNTER SYMPTOMS: BACK PAIN: 1

## 2020-08-25 ENCOUNTER — OFFICE VISIT (OUTPATIENT)
Dept: INTERNAL MEDICINE CLINIC | Age: 65
End: 2020-08-25
Payer: MEDICAID

## 2020-08-25 VITALS
HEIGHT: 72 IN | SYSTOLIC BLOOD PRESSURE: 140 MMHG | WEIGHT: 148 LBS | BODY MASS INDEX: 20.05 KG/M2 | TEMPERATURE: 97.5 F | DIASTOLIC BLOOD PRESSURE: 90 MMHG

## 2020-08-25 PROCEDURE — 3017F COLORECTAL CA SCREEN DOC REV: CPT | Performed by: INTERNAL MEDICINE

## 2020-08-25 PROCEDURE — G8420 CALC BMI NORM PARAMETERS: HCPCS | Performed by: INTERNAL MEDICINE

## 2020-08-25 PROCEDURE — 4004F PT TOBACCO SCREEN RCVD TLK: CPT | Performed by: INTERNAL MEDICINE

## 2020-08-25 PROCEDURE — 99213 OFFICE O/P EST LOW 20 MIN: CPT | Performed by: INTERNAL MEDICINE

## 2020-08-25 PROCEDURE — G8427 DOCREV CUR MEDS BY ELIG CLIN: HCPCS | Performed by: INTERNAL MEDICINE

## 2020-08-25 NOTE — PROGRESS NOTES
2020     Bharti Dowling (:  1955) is a 60 y.o. male, here for evaluation of the following medical concerns:    Chief Complaint   Patient presents with    Forms     complete VA papers        HPI    He is here to complete VA paperwork. He is applying for total disability for his back pain. He has been on partial disability for some time. He is significantly limited in his daily activities by his back pain. He is recovering well from back surgery, pain is improved from prior to surgery but still significant. He is currently unable to bend, twist, or lift heavy objects. He is only able to prepare very simple meals, such as make a sandwich, due to the back pain. Pain increases significantly if he is standing for long periods of time. He is able to walk short distances without pain, he uses a cane to ambulate longer distances. He infrequently goes out of the house except for    Review of Systems   Musculoskeletal: Positive for back pain. Prior to Visit Medications    Medication Sig Taking? Authorizing Provider   tiZANidine (ZANAFLEX) 4 MG tablet Take 1 tablet by mouth 4 times daily as needed (muscle spasms) Yes Annel Orellana MD   ibuprofen (ADVIL;MOTRIN) 600 MG tablet Take 1 tablet by mouth every 6 hours as needed for Pain Yes Berny Al MD   acetaminophen (TYLENOL) 500 MG tablet Take 2 tablets by mouth 3 times daily as needed for Pain Yes Berny Al MD   vitamin D (ERGOCALCIFEROL) 1.25 MG (94362 UT) CAPS capsule Take 1 capsule by mouth once a week Yes Annel Orellana MD   gabapentin (NEURONTIN) 800 MG tablet Take 800 mg by mouth 2 times daily. Yes Historical Provider, MD   lidocaine (LIDODERM) 5 % Place 1 patch onto the skin daily 12 hours on, 12 hours off.  Yes Historical Provider, MD   dextrose 5 % SOLN 49 mL with alprostadil 500 MCG/ML SOLN 500 mcg Infuse 0.01 mcg/kg/min intravenously continuous Inject 10 mcg intravenously as needed for use prior to sexual activity Yes Historical Provider, MD        Past Medical History:   Diagnosis Date    Arthritis     BPH (benign prostatic hyperplasia)     Colon polyps        Past Surgical History:   Procedure Laterality Date    ABDOMINAL HERNIA REPAIR      LUMBAR FUSION N/A 7/9/2020    L3-4, L4-5 ANTERIOR LUMBAR INTERBODY FUSION WITH performed by James Cervantes. Ruth Nowak MD at [address] [facility] 7/9/2020    PEDICLE SCREW FIXATION performed by James Cervantes. Ruth Nowak MD at [address]         Social History     Tobacco Use    Smoking status: Current Every Day Smoker     Packs/day: 0.50     Years: 48.00     Pack years: 24.00     Types: Cigarettes    Smokeless tobacco: Never Used   Substance Use Topics    Alcohol use: Yes     Frequency: 2-4 times a month     Comment: 1 per week        Family History   Problem Relation Age of Onset    Diabetes Mother     Cancer Father         prostate?  Heart Attack Father     Stroke Brother     Diabetes Brother     Heart Attack Brother     Diabetes Brother     Dementia Sister        Vitals:    08/25/20 1514   BP: (!) 140/90   Site: Left Upper Arm   Position: Sitting   Cuff Size: Medium Adult   Temp: 97.5 °F (36.4 °C)   TempSrc: Oral   Weight: 148 lb (67.1 kg)   Height: 6' (1.829 m)     Estimated body mass index is 20.07 kg/m² as calculated from the following:    Height as of this encounter: 6' (1.829 m). Weight as of this encounter: 148 lb (67.1 kg). Physical Exam  Vitals signs reviewed. Constitutional:       General: He is not in acute distress. Appearance: Normal appearance. He is normal weight. HENT:      Head: Normocephalic and atraumatic. Pulmonary:      Effort: Pulmonary effort is normal.   Musculoskeletal:         General: Tenderness (along lumbar paraspinal muscles, no midline tenderness) present. Skin:     General: Skin is warm and dry. Neurological:      Mental Status: He is alert.       Comments: Hip flexor strength 4- bilaterally  Rest of lower extremity strength normal though certain movements induce pain  Diminished reflexes  Gait slow, decreased mata         ASSESSMENT/PLAN:  1.  Chronic bilateral low back pain, unspecified whether sciatica present  - paperwork completed, copied

## 2020-09-05 ASSESSMENT — ENCOUNTER SYMPTOMS: BACK PAIN: 1

## 2020-09-11 ENCOUNTER — TELEPHONE (OUTPATIENT)
Dept: INTERNAL MEDICINE CLINIC | Age: 65
End: 2020-09-11

## 2020-09-11 NOTE — TELEPHONE ENCOUNTER
The patient is calling about his paperwork for housing that need to be completed.     Please advise of status and when ready to pick-up

## 2020-09-11 NOTE — TELEPHONE ENCOUNTER
He can pick it up, he's actually the one that needs to complete it, we filled out the office information

## 2020-10-07 ENCOUNTER — VIRTUAL VISIT (OUTPATIENT)
Dept: INTERNAL MEDICINE CLINIC | Age: 65
End: 2020-10-07
Payer: MEDICAID

## 2020-10-07 PROCEDURE — 99213 OFFICE O/P EST LOW 20 MIN: CPT | Performed by: INTERNAL MEDICINE

## 2020-10-07 PROCEDURE — G8427 DOCREV CUR MEDS BY ELIG CLIN: HCPCS | Performed by: INTERNAL MEDICINE

## 2020-10-07 PROCEDURE — 3017F COLORECTAL CA SCREEN DOC REV: CPT | Performed by: INTERNAL MEDICINE

## 2020-10-07 RX ORDER — PREDNISONE 20 MG/1
40 TABLET ORAL DAILY
Qty: 10 TABLET | Refills: 0 | Status: SHIPPED | OUTPATIENT
Start: 2020-10-07 | End: 2020-10-12

## 2020-10-07 RX ORDER — DOXYCYCLINE HYCLATE 100 MG
100 TABLET ORAL 2 TIMES DAILY
Qty: 10 TABLET | Refills: 0 | Status: SHIPPED | OUTPATIENT
Start: 2020-10-07 | End: 2020-10-12

## 2020-10-07 NOTE — PROGRESS NOTES
10/7/2020    TELEHEALTH EVALUATION -- Audio/Visual (During TQHQZ-03 public health emergency)    HPI:    Abdelrahman Garcia (:  1955) has requested an audio/video evaluation for the following concern(s):    Symptoms have been present for at least a week, week and a half, doing all OTC medication, not improving. Chest and sinus congestion have continued. Light/clear colored, not green colored. Denies shortness of breath, his girlfriend has told him that she notes wheezing. He does not have an inhaler. He is still smoking, has cut back a bit since he has been sick. Review of Systems   Constitutional: Negative for fever. HENT: Negative for sinus pain. Respiratory: Positive for wheezing. Negative for shortness of breath. Prior to Visit Medications    Medication Sig Taking? Authorizing Provider   predniSONE (DELTASONE) 20 MG tablet Take 2 tablets by mouth daily for 5 days Yes Lesa Dnet MD   doxycycline hyclate (VIBRA-TABS) 100 MG tablet Take 1 tablet by mouth 2 times daily for 5 days Yes Lesa Dent MD   tiZANidine (ZANAFLEX) 4 MG tablet Take 1 tablet by mouth 4 times daily as needed (muscle spasms) Yes Lesa Dent MD   ibuprofen (ADVIL;MOTRIN) 600 MG tablet Take 1 tablet by mouth every 6 hours as needed for Pain Yes Adam Dorsey MD   acetaminophen (TYLENOL) 500 MG tablet Take 2 tablets by mouth 3 times daily as needed for Pain Yes Adam Dorsey MD   vitamin D (ERGOCALCIFEROL) 1.25 MG (90443 UT) CAPS capsule Take 1 capsule by mouth once a week Yes Lesa Dent MD   gabapentin (NEURONTIN) 800 MG tablet Take 800 mg by mouth 2 times daily. Yes Historical Provider, MD   lidocaine (LIDODERM) 5 % Place 1 patch onto the skin daily 12 hours on, 12 hours off.  Yes Historical Provider, MD   dextrose 5 % SOLN 49 mL with alprostadil 500 MCG/ML SOLN 500 mcg Infuse 0.01 mcg/kg/min intravenously continuous Inject 10 mcg intravenously as needed for use prior to sexual activity Yes Historical Provider, MD       Social History     Tobacco Use    Smoking status: Current Every Day Smoker     Packs/day: 0.50     Years: 48.00     Pack years: 24.00     Types: Cigarettes    Smokeless tobacco: Never Used   Substance Use Topics    Alcohol use: Yes     Frequency: 2-4 times a month     Comment: 1 per week    Drug use: Never            PHYSICAL EXAMINATION:  [ INSTRUCTIONS:  \"[x]\" Indicates a positive item  \"[]\" Indicates a negative item  -- DELETE ALL ITEMS NOT EXAMINED]  Vital Signs: (As obtained by patient/caregiver or practitioner observation)    Blood pressure-  Heart rate-    Respiratory rate-    Temperature-  Pulse oximetry-     Constitutional: [x] Appears well-developed and well-nourished [x] No apparent distress      [] Abnormal-   Mental status  [x] Alert and awake  [x] Oriented to person/place/time [x]Able to follow commands      Eyes:  EOM    [x]  Normal  [] Abnormal-  Sclera  [x]  Normal  [] Abnormal -         Discharge [x]  None visible  [] Abnormal -    HENT:   [x] Normocephalic, atraumatic. [] Abnormal   [x] Mouth/Throat: Mucous membranes are moist.     External Ears [x] Normal  [] Abnormal-     Neck: [x] No visualized mass     Pulmonary/Chest: [x] Respiratory effort normal.  [x] No visualized signs of difficulty breathing or respiratory distress        [] Abnormal-      Musculoskeletal:   [] Normal gait with no signs of ataxia         [] Normal range of motion of neck        [] Abnormal-       Neurological:        [x] No Facial Asymmetry (Cranial nerve 7 motor function) (limited exam to video visit)          [] No gaze palsy        [] Abnormal-         Skin:        [] No significant exanthematous lesions or discoloration noted on facial skin         [] Abnormal-            Psychiatric:       [x] Normal Affect [x] No Hallucinations        [] Abnormal-     Other pertinent observable physical exam findings-     ASSESSMENT/PLAN:  1.  Bronchitis  - with smoking history, likely having some reactive airways in the setting of viral infection. Will treat with prednisone 40mg x5 days, as well as doxycycline 100mg BID x5 days      Return if symptoms worsen or fail to improve. Steven Mederos is a 60 y.o. male being evaluated by a Virtual Visit (video visit) encounter to address concerns as mentioned above. A caregiver was present when appropriate. Due to this being a TeleHealth encounter (During [facility]- public health emergency), evaluation of the following organ systems was limited: Vitals/Constitutional/EENT/Resp/CV/GI//MS/Neuro/Skin/Heme-Lymph-Imm. Pursuant to the emergency declaration under the [address] [place] [address] authority and the [employer] Act, this Virtual Visit was conducted with patient's (and/or legal guardian's) consent, to reduce the patient's risk of exposure to COVID-19 and provide necessary medical care. The patient (and/or legal guardian) has also been advised to contact this office for worsening conditions or problems, and seek emergency medical treatment and/or call 911 if deemed necessary. Patient identification was verified at the start of the visit: Yes    Total time spent on this encounter: Not billed by time    Services were provided through a video synchronous discussion virtually to substitute for in-person clinic visit. Patient and provider were located at their individual homes. --Adam Feliciano MD on 10/8/2020 at 6:11 AM    An electronic signature was used to authenticate this note.

## 2020-10-08 ASSESSMENT — ENCOUNTER SYMPTOMS
WHEEZING: 1
SINUS PAIN: 0
SHORTNESS OF BREATH: 0

## 2020-10-28 ENCOUNTER — TELEPHONE (OUTPATIENT)
Dept: INTERNAL MEDICINE CLINIC | Age: 65
End: 2020-10-28

## 2020-10-28 NOTE — TELEPHONE ENCOUNTER
I would add fluticasone nasal spray (this is generic Flonase) and take an antihistamine like Zyrtec. If this isn't helping at all let me know.

## 2020-10-28 NOTE — TELEPHONE ENCOUNTER
Patient called [facility] he had a virtual visit on 10/07/20 and you put him on antibiotics but he still has the stuffy nose and sinus pressure. When he does blow mucus out, it is clear. Does he need to come in or maybe try a different medication?       Please call to advise     Rhoda Schmidt [address] [place] [address], [address] [facility]

## 2020-12-07 ENCOUNTER — TELEPHONE (OUTPATIENT)
Dept: INTERNAL MEDICINE CLINIC | Age: 65
End: 2020-12-07

## 2020-12-07 RX ORDER — GABAPENTIN 800 MG/1
800 TABLET ORAL 2 TIMES DAILY
Qty: 180 TABLET | Refills: 1 | Status: SHIPPED | OUTPATIENT
Start: 2020-12-07 | End: 2021-06-05

## 2021-01-12 ENCOUNTER — TELEPHONE (OUTPATIENT)
Dept: INTERNAL MEDICINE CLINIC | Age: 66
End: 2021-01-12

## 2021-01-12 DIAGNOSIS — M54.50 CHRONIC LOW BACK PAIN, UNSPECIFIED BACK PAIN LATERALITY, UNSPECIFIED WHETHER SCIATICA PRESENT: Primary | ICD-10-CM

## 2021-01-12 DIAGNOSIS — G89.29 CHRONIC LOW BACK PAIN, UNSPECIFIED BACK PAIN LATERALITY, UNSPECIFIED WHETHER SCIATICA PRESENT: Primary | ICD-10-CM

## 2021-01-12 NOTE — TELEPHONE ENCOUNTER
Dr. Richard Marcum at [address] [place] [address] [address], or Dr. Marce Hernandez at [facility]

## 2021-01-25 ENCOUNTER — TELEPHONE (OUTPATIENT)
Dept: INTERNAL MEDICINE CLINIC | Age: 66
End: 2021-01-25

## 2021-02-03 ENCOUNTER — TELEPHONE (OUTPATIENT)
Dept: INTERNAL MEDICINE CLINIC | Age: 66
End: 2021-02-03

## 2021-02-03 DIAGNOSIS — M54.50 CHRONIC LOW BACK PAIN, UNSPECIFIED BACK PAIN LATERALITY, UNSPECIFIED WHETHER SCIATICA PRESENT: Primary | ICD-10-CM

## 2021-02-03 DIAGNOSIS — G89.29 CHRONIC LOW BACK PAIN, UNSPECIFIED BACK PAIN LATERALITY, UNSPECIFIED WHETHER SCIATICA PRESENT: Primary | ICD-10-CM

## 2021-02-03 NOTE — TELEPHONE ENCOUNTER
New referral has been placed and faxed to the office. Patient notified. Orders were also mailed to the patient.

## 2021-02-03 NOTE — TELEPHONE ENCOUNTER
Patient called stating that Dr. Nora Lomax office does not take his insurance. Can you please put in a referral for Dr. Pravin Ashley for pain management? Please call to advise.

## 2021-02-05 ENCOUNTER — TELEPHONE (OUTPATIENT)
Dept: INTERNAL MEDICINE CLINIC | Age: 66
End: 2021-02-05

## 2021-02-05 NOTE — TELEPHONE ENCOUNTER
Pt called for referral to pain management. Scheduling number provided and referral will be sent via mail.

## 2021-04-22 ENCOUNTER — OFFICE VISIT (OUTPATIENT)
Dept: PAIN MANAGEMENT | Age: 66
End: 2021-04-22
Payer: MEDICAID

## 2021-04-22 VITALS
DIASTOLIC BLOOD PRESSURE: 78 MMHG | WEIGHT: 157.4 LBS | SYSTOLIC BLOOD PRESSURE: 129 MMHG | TEMPERATURE: 97.3 F | OXYGEN SATURATION: 98 % | HEIGHT: 72 IN | HEART RATE: 91 BPM | BODY MASS INDEX: 21.32 KG/M2

## 2021-04-22 DIAGNOSIS — G47.33 OSA (OBSTRUCTIVE SLEEP APNEA): ICD-10-CM

## 2021-04-22 DIAGNOSIS — M51.36 DDD (DEGENERATIVE DISC DISEASE), LUMBAR: ICD-10-CM

## 2021-04-22 DIAGNOSIS — M54.42 CHRONIC LEFT-SIDED LOW BACK PAIN WITH LEFT-SIDED SCIATICA: ICD-10-CM

## 2021-04-22 DIAGNOSIS — M43.16 SPONDYLOLISTHESIS OF LUMBAR REGION: ICD-10-CM

## 2021-04-22 DIAGNOSIS — Z98.1 S/P LUMBAR AND LUMBOSACRAL FUSION BY ANTERIOR TECHNIQUE: ICD-10-CM

## 2021-04-22 DIAGNOSIS — Z51.81 ENCOUNTER FOR THERAPEUTIC DRUG MONITORING: ICD-10-CM

## 2021-04-22 DIAGNOSIS — G89.4 CHRONIC PAIN SYNDROME: ICD-10-CM

## 2021-04-22 DIAGNOSIS — M47.816 FACET ARTHROPATHY, LUMBAR: ICD-10-CM

## 2021-04-22 DIAGNOSIS — G89.29 CHRONIC LEFT-SIDED LOW BACK PAIN WITH LEFT-SIDED SCIATICA: ICD-10-CM

## 2021-04-22 PROCEDURE — G8420 CALC BMI NORM PARAMETERS: HCPCS | Performed by: NURSE PRACTITIONER

## 2021-04-22 PROCEDURE — G8428 CUR MEDS NOT DOCUMENT: HCPCS | Performed by: NURSE PRACTITIONER

## 2021-04-22 PROCEDURE — 99244 OFF/OP CNSLTJ NEW/EST MOD 40: CPT | Performed by: NURSE PRACTITIONER

## 2021-04-22 NOTE — PROGRESS NOTES
HISTORY OF PRESENT ILLNESS:  Mr. Torsten Garcia is a 63 y.o. male presents for consultation at the kind request of Dr. Sariah Curran. Brito his primary care physician for chronic pain management. His presenting problems are pain to the left lateral abdomen, lower back radiating to the left upper leg/feet. He has also been evaluated by vascular for left lower quadrant pain. Onset of pain to the lower back began over 40 years ago while in the [address] in [facility]. Reports an object fell on him causing pain, he was briefly treated for minor injuries, no fractures at that time were appreciated. Endorses having received multiple ALEE's at the [facility] with no relief of pain. He had lumbar interbody fusion with Dr. John Duncan on 7/9/2020 which improved pain threshold not mobility. He was treated a few days after surgery in the hospital for overuse of Valium/Percocet which patient denied noting this was due to taking medications on an empty stomach. He was last prescribed tramadol 50 mg tablets on 12/2020/20 x12 days post surgical surgery. Currently on Neurontin 800 mg 3 times daily through his primary care physician. Last had physical therapy a year ago. He rates the pain in the lower back at 10/10, 8/10 in the legs, 7/10 in the neck. He describes it as aching, burning, numbness, tingling, pins and needles. Pain is greaterin his lower back than neck/legs. Pain is made worse by: walking, standing, sitting, bending, lifting, house chores, reaching overhead, getting up in the morning, getting in/out of a chair, going up/down the stairs, putting shoes/socks on. Activities that have been limited by pain that he otherwise tolerated well are working, playing with his grandchildren. Alternative therapies he haspreviously attempted are pain medicine, local injections, heating pad, cane, TENS unit, massage therapy. Current treatment regimen has helped relieve about 40% of the pain.  Relieving factors of pain include massage therapy, exercising. Hedenies side effects from the current pain regimen. In the last week he reports having very bothersome symptoms to the lower back/legs most of the time with numbness/tingling to the feet. Pain prevents him from dressing without discomfort, lifting heavy objects, walking more than 30 minutes, sitting more than 30 minutes, standing more than 30 minutes. His social/recreational life is severely restricted by pain. His sexual life is nearly unchanged but causes an increase in pain. Patient reports mood is well and that he has no suicidal/homicidal inclination. Has been depressed in the passed, currently taking mood stabilizers at the [facility], over all stable mood. Sleep patterns are fair with an average of 3-6 hours. Patient denies neurological bowel or bladder concerns. Patient denies misusing/abusing his narcotic pain medications or using any illegal drugs. Admits to having used marijuana in the past as well as cocaine 2 days ago to help him relax. he admits to morning stiffness, occasional fatigue, and headaches. Currently on disability, lives alone, has 4 grown children. Smokes 1/2 a pack of cigarettes a day. ROS:  The patient's social history, past medical history, family history, medications, allergies and review of systems have all been reviewed and verified from  the patient questionnaire form which has been filled by the patient. The  allergies, medication list  and past medical and surgical history and other information recorded by the MA was again reviewed today. Please check page 6 of the patient questionnaire for for family history  These forms have been scanned into the \"media\" tab of patients electronic medical record. PHYSICAL EXAM:  Please see the physical exam form for a detailed examination on this visit. This form has been completed and scanned into the  Media section of the chart  . Physical Exam  Constitutional:       General: He is not in acute distress.      Appearance: He is well-developed. HENT:      Head: Normocephalic and atraumatic. Nose: Nose normal.   Eyes:      Conjunctiva/sclera: Conjunctivae normal.      Pupils: Pupils are equal, round, and reactive to light. Neck:      Musculoskeletal: Normal range of motion and neck supple. Thyroid: No thyromegaly. Cardiovascular:      Rate and Rhythm: Normal rate and regular rhythm. Heart sounds: Normal heart sounds. Pulmonary:      Effort: Pulmonary effort is normal. No respiratory distress. Breath sounds: Normal breath sounds. Abdominal:      General: Bowel sounds are normal. There is no distension. Palpations: Abdomen is soft. Tenderness: There is no abdominal tenderness. There is no right CVA tenderness or left CVA tenderness. Musculoskeletal:      Right shoulder: He exhibits tenderness. Left shoulder: He exhibits tenderness (mild tender spots noted with palpation). Right hip: He exhibits tenderness (tenderness to the lumbar region with palpation). He exhibits normal range of motion. Left hip: He exhibits normal range of motion and no tenderness. Lumbar back: He exhibits decreased range of motion, tenderness (guarded pain noted with palpation, 20 degree flexion, 2 degree extention) and bony tenderness. Left upper leg: He exhibits tenderness. Left lower leg: He exhibits tenderness. Lymphadenopathy:      Cervical: No cervical adenopathy. Lower Body: No left inguinal adenopathy. Skin:     General: Skin is warm and dry. Findings: Rash present. Rash is scaling (dryness with scaling bilateral feet). Nails: There is no clubbing. Neurological:      Mental Status: He is alert and oriented to person, place, and time. Cranial Nerves: No cranial nerve deficit. Sensory: No sensory deficit. Motor: Weakness present. Coordination: Coordination is intact. Gait: Gait abnormal (slow steady gait with the aid of a cane).       Deep Tendon Reflexes: Reflexes are normal and symmetric. Reflex Scores:       Patellar reflexes are 2+ on the right side and 2+ on the left side. Achilles reflexes are 2+ on the right side and 2+ on the left side. Psychiatric:         Mood and Affect: Mood is depressed. Affect is flat. Speech: Speech normal.         Behavior: Behavior is cooperative. Xray of the Lumbar spine 7/9/20:  13 intraoperative fluoroscopic spot films show interbody fusion at the levels of L3-L4 and L4-L5. MRI of the Lumbar spine 3/4/20:  Recent appearing mild superior endplate compression fractures of T12 and L2 as detailed.       Multilevel degenerative disc disease and facet arthropathy as detailed.       Mild central canal stenosis at L3-L4 and L4-L5.       Moderate left L3 and left L4 foraminal stenosis. Mild-to-moderate left L5 foraminal stenosis. /78   Pulse 91   Temp 97.3 °F (36.3 °C) (Infrared)   Ht 6' (1.829 m)   Wt 157 lb 6.4 oz (71.4 kg)   SpO2 98%   BMI 21.35 kg/m²      ASSESSMENT:    1. Chronic pain syndrome    2. S/P lumbar and lumbosacral fusion by anterior technique, 7/9/20 with Dr. Kamila Pierre    3. DDD (degenerative disc disease), lumbar    4. Facet arthropathy, lumbar    5. Spondylolisthesis of lumbar region    6. Chronic left-sided low back pain with left-sided sciatica    7. Encounter for therapeutic drug monitoring    8. ALEXANDRU (obstructive sleep apnea)         PLAN:   -Chronic opiate treatment protocol was discussed withthe patient, informed consent was obtained.   -Treatment guidelines were discussed and established  -Risks and benefits of narcotics were addressedwith the patient  - Obtainable long term and short term goals of opioid therapy were reviewed, including pain relief, sleep, psychosocial and physical functioning   -Obtain urine Toxicology today  -No opioids were prescribed for the patient today  -Declined PT, [place] exercises/back stretches reviewed  -Patient is currently taking Neurontin 800 mg tabs, Zanaflex 4mg tabs through his PCP  -Patient admitted to using cocaine 2 days ago for relaxation purposes, informed patient that illegal drugs are not permissible to obtain narcotic prescriptions. UDS will be evaluated once completed, patient verbalized understanding  -CBT techniques- relaxation therapies such as biofeedback, mindfulness based stress reduction, imagery, cognitive restructuring, problem solving discussed with patient  -Advised patient to quit smoking for  health related concerns and to improve the treatment outcomes. Education was given on quitting smoking and the use of different modalities including medications, hypnotherapy, counselling  and biofeedback. These were discussed with patient. -SOAPP score 8  -ORT score 9 high risk  -PHQ-9 score 6 mild depression  -Return in about 4 weeks (around 5/20/2021). Controlled Substances Monitoring: Periodic Controlled Substance Monitoring: No signs of potential drug abuse or diversion identified.  Mono Guardado, APRN - CNP)

## 2021-04-22 NOTE — PATIENT INSTRUCTIONS
Patient Education        Back Stretches: Exercises  Introduction  Here are some examples of exercises for stretching your back. Start each exercise slowly. Ease off the exercise if you start to have pain. Your doctor or physical therapist will tell you when you can start these exercises and which ones will work best for you. How to do the exercises  Overhead stretch   1. Stand comfortably with your feet shoulder-width apart. 2. Looking straight ahead, raise both arms over your head and reach toward the ceiling. Do not allow your head to tilt back. 3. Hold for 15 to 30 seconds, then lower your arms to your sides. 4. Repeat 2 to 4 times. Side stretch   1. Stand comfortably with your feet shoulder-width apart. 2. Raise one arm over your head, and then lean to the other side. 3. Slide your hand down your leg as you let the weight of your arm gently stretch your side muscles. Hold for 15 to 30 seconds. 4. Repeat 2 to 4 times on each side. Press-up   1. Lie on your stomach, supporting your body with your forearms. 2. Press your elbows down into the floor to raise your upper back. As you do this, relax your stomach muscles and allow your back to arch without using your back muscles. As your press up, do not let your hips or pelvis come off the floor. 3. Hold for 15 to 30 seconds, then relax. 4. Repeat 2 to 4 times. Relax and rest   1. Lie on your back with a rolled towel under your neck and a pillow under your knees. Extend your arms comfortably to your sides. 2. Relax and breathe normally. 3. Remain in this position for about 10 minutes. 4. If you can, do this 2 or 3 times each day. Follow-up care is a key part of your treatment and safety. Be sure to make and go to all appointments, and call your doctor if you are having problems. It's also a good idea to know your test results and keep a list of the medicines you take. Where can you learn more? Go to https://jesus.[employer]. org and sign in to your [employer] account. Enter K695 in the [employer] Music box to learn more about \"Back Stretches: Exercises. \"     If you do not have an account, please click on the \"Sign Up Now\" link. Current as of: November 16, 2020               Content Version: 12.8  © 7550-3370 [employer]. Care instructions adapted under license by [facility] ([facility]). If you have questions about a medical condition or this instruction, always ask your healthcare professional. [address] any warranty or liability for your use of this information.

## 2021-04-27 ENCOUNTER — TELEPHONE (OUTPATIENT)
Dept: PAIN MANAGEMENT | Age: 66
End: 2021-04-27

## 2021-04-29 ENCOUNTER — TELEPHONE (OUTPATIENT)
Dept: PAIN MANAGEMENT | Age: 66
End: 2021-04-29

## 2021-04-29 NOTE — TELEPHONE ENCOUNTER
Per PKA, due to recent results, he will be discharged. Called and spoke with pt, he expressed his understanding.

## 2021-05-04 ENCOUNTER — TELEPHONE (OUTPATIENT)
Dept: EMERGENCY DEPT | Age: 66
End: 2021-05-04

## 2021-05-04 NOTE — TELEPHONE ENCOUNTER
[place] Member contacted patient by phone to support with scheduling COVID-19 vaccination appointment. Patient did not answer. Unable to leave .

## 2021-05-04 NOTE — TELEPHONE ENCOUNTER
Pt returned missed call from earlier this afternoon. Pt is interested in scheduling a COVID vaccine and is checkig his calendar and will call back when ready to schedule.

## 2021-06-03 RX ORDER — GABAPENTIN 800 MG/1
TABLET ORAL
Qty: 180 TABLET | Refills: 1 | OUTPATIENT
Start: 2021-06-03

## 2024-10-15 NOTE — PROGRESS NOTES
Oral airway removed, pt restless when waking up , trying to turn on left side, pt moving all extremities but not answering questions at this time close supervision

## (undated) DEVICE — UNDERGLOVE SURG SZ 8 BLU LTX FREE SYN POLYISOPRENE POLYMER

## (undated) DEVICE — GLOVE SURG SZ 6 L12IN FNGR THK75MIL WHT LTX POLYMER BEAD

## (undated) DEVICE — SUTURE PDS II SZ 0 L60IN ABSRB VLT L48MM CTX 1/2 CIR Z990G

## (undated) DEVICE — COVER LT HNDL CAM BLU DISP W/ SURG CTRL

## (undated) DEVICE — SUTURE MCRYL SZ 4-0 L27IN ABSRB UD L19MM PS-2 1/2 CIR PRIM Y426H

## (undated) DEVICE — ADHESIVE SKIN CLSR 0.7ML TOP DERMBND ADV

## (undated) DEVICE — ELECTRODE NERVE STIM FOR SPNL CRD MONITORING

## (undated) DEVICE — 3M™ TEGADERM™ TRANSPARENT FILM DRESSING FRAME STYLE, 1626W, 4 IN X 4-3/4 IN (10 CM X 12 CM), 50/CT 4CT/CASE: Brand: 3M™ TEGADERM™

## (undated) DEVICE — SYSTEM SKIN CLSR 22CM DERMBND PRINEO

## (undated) DEVICE — APPLIER LIG CLP M L11IN TI STR RNG HNDL FOR 20 CLP DISP

## (undated) DEVICE — SUTURE PERMAHAND SZ 2-0 L30IN NONABSORBABLE BLK SILK W/O A305H

## (undated) DEVICE — LAMINECTOMY PK

## (undated) DEVICE — CABLE BPLR L12FT FLYING LD DISPOSABLE

## (undated) DEVICE — SYRINGE 30CC LUER LOCK: Brand: CARDINAL HEALTH

## (undated) DEVICE — TRAY CATHETER 16FR F INCLUDE BARDX IC COMPLT CARE DRNGE BG

## (undated) DEVICE — DRAPE 33X23IN INCISE ANTIMICROB IOBAN 2

## (undated) DEVICE — APPLIER CLP L9.375IN APER 2.1MM CLS L3.8MM 20 SM TI CLP

## (undated) DEVICE — BLANKET WRM W29.9XL79.1IN UP BODY FORC AIR MISTRAL-AIR

## (undated) DEVICE — SHEET, T, LAPAROTOMY, STERILE: Brand: MEDLINE

## (undated) DEVICE — SUTURE VCRL SZ 2-0 L18IN ABSRB UD CT-1 L36MM 1/2 CIR J839D

## (undated) DEVICE — SUTURE PERMAHAND SZ 0 L30IN NONABSORBABLE BLK SILK BRAID A306H

## (undated) DEVICE — SUTURE VCRL SZ 0 L18IN ABSRB UD L36MM CT-1 1/2 CIR J840D

## (undated) DEVICE — PLATE ES AD W 9FT CRD 2

## (undated) DEVICE — GLOVE SURG SZ 75 L12IN FNGR THK94MIL TRNSLUC YEL LTX

## (undated) DEVICE — GLOVE SURG SZ 75 L12IN FNGR THK87MIL DK GRN LTX FREE ISOLEX

## (undated) DEVICE — SUTURE VCRL SZ 3-0 L27IN ABSRB UD L36MM CT-1 1/2 CIR J258H

## (undated) DEVICE — ORTHO PRE OP PACK: Brand: MEDLINE INDUSTRIES, INC.

## (undated) DEVICE — SPONGE,LAP,18"X18",DLX,XR,ST,5/PK,40/PK: Brand: MEDLINE

## (undated) DEVICE — SPONGE,NEURO,0.5"X3",XR,STRL,LF,10/PK: Brand: MEDLINE

## (undated) DEVICE — SOLUTION IV 1000ML 0.9% SOD CHL

## (undated) DEVICE — MARKER REFLECTIVE REFLECTIVE TWST ON SPHERZ 5PK

## (undated) DEVICE — TOOL 14MH30 LEGEND 14CM 3MM: Brand: MIDAS REX ™

## (undated) DEVICE — GLOVE SURG SZ 65 CRM LTX FREE POLYISOPRENE POLYMER BEAD ANTI

## (undated) DEVICE — TRAY,IRRIGATION,BULBSYRINGE,60ML,CSR,PVP: Brand: MEDLINE

## (undated) DEVICE — SURE SET-DOUBLE BASIN-LF: Brand: MEDLINE INDUSTRIES, INC.

## (undated) DEVICE — TUBING, SUCTION, 1/4" X 12', STRAIGHT: Brand: MEDLINE

## (undated) DEVICE — SPONGE,PEANUT,XRAY,ST,SM,3/8",5/CARD: Brand: MEDLINE INDUSTRIES, INC.

## (undated) DEVICE — Device: Brand: DISPOSABLE TROCAR INSERT PEDICLE ACCESS NEEDLE (1 PCS.)

## (undated) DEVICE — CATHETER IV 14GA L5.25IN PERIPH ORNG FEP POLYMER 3 BVL

## (undated) DEVICE — APPLICATOR MEDICATED 26 CC SOLUTION HI LT ORNG CHLORAPREP

## (undated) DEVICE — JEWISH HOSPITAL TURNOVER KIT: Brand: MEDLINE INDUSTRIES, INC.

## (undated) DEVICE — GARMENT,MEDLINE,DVT,INT,CALF,MED, GEN2: Brand: MEDLINE

## (undated) DEVICE — GAUZE,SPONGE,4"X4",16PLY,XRAY,STRL,LF: Brand: MEDLINE

## (undated) DEVICE — BLADE ES L4IN INSUL EDGE